# Patient Record
Sex: FEMALE | Race: WHITE | NOT HISPANIC OR LATINO | Employment: OTHER | URBAN - METROPOLITAN AREA
[De-identification: names, ages, dates, MRNs, and addresses within clinical notes are randomized per-mention and may not be internally consistent; named-entity substitution may affect disease eponyms.]

---

## 2023-03-17 DIAGNOSIS — B37.31 VAGINAL YEAST INFECTION: Primary | ICD-10-CM

## 2023-03-17 RX ORDER — FLUCONAZOLE 150 MG/1
150 TABLET ORAL ONCE
Qty: 1 TABLET | Refills: 0 | Status: SHIPPED | OUTPATIENT
Start: 2023-03-17 | End: 2023-03-22 | Stop reason: SDUPTHER

## 2023-03-21 PROBLEM — M51.26 HERNIATED NUCLEUS PULPOSUS, L4-5 RIGHT: Status: ACTIVE | Noted: 2023-03-21

## 2023-03-21 PROBLEM — M17.12 PRIMARY OSTEOARTHRITIS OF LEFT KNEE: Status: ACTIVE | Noted: 2023-03-21

## 2023-03-21 PROBLEM — N18.30 CHRONIC KIDNEY DISEASE (CKD), STAGE III (MODERATE) (MULTI): Status: ACTIVE | Noted: 2023-03-21

## 2023-03-21 PROBLEM — M96.1 FAILED BACK SYNDROME OF LUMBAR SPINE: Status: ACTIVE | Noted: 2023-03-21

## 2023-03-21 PROBLEM — I50.9 CONGESTIVE HEART FAILURE (MULTI): Status: ACTIVE | Noted: 2023-03-21

## 2023-03-21 PROBLEM — I48.91 ATRIAL FIBRILLATION (MULTI): Status: ACTIVE | Noted: 2023-03-21

## 2023-03-21 PROBLEM — E03.9 HYPOTHYROIDISM, ADULT: Status: ACTIVE | Noted: 2023-03-21

## 2023-03-21 PROBLEM — L03.90 CELLULITIS: Status: ACTIVE | Noted: 2023-03-21

## 2023-03-21 PROBLEM — G31.84 MILD COGNITIVE IMPAIRMENT: Status: ACTIVE | Noted: 2023-03-21

## 2023-03-21 PROBLEM — E78.5 HYPERLIPIDEMIA: Status: ACTIVE | Noted: 2023-03-21

## 2023-03-21 PROBLEM — R10.9 LEFT SIDED ABDOMINAL PAIN: Status: ACTIVE | Noted: 2023-03-21

## 2023-03-21 PROBLEM — C50.919 BREAST CANCER (MULTI): Status: ACTIVE | Noted: 2023-03-21

## 2023-03-21 PROBLEM — M51.27 HERNIATED NUCLEUS PULPOSUS, L5-S1, RIGHT: Status: ACTIVE | Noted: 2023-03-21

## 2023-03-21 PROBLEM — E78.5 HYPERLIPEMIA: Status: ACTIVE | Noted: 2023-03-21

## 2023-03-21 PROBLEM — M75.101 RIGHT ROTATOR CUFF TEAR: Status: ACTIVE | Noted: 2023-03-21

## 2023-03-21 PROBLEM — M75.51 BURSITIS OF RIGHT SHOULDER: Status: ACTIVE | Noted: 2023-03-21

## 2023-03-21 PROBLEM — M75.121 COMPLETE TEAR OF RIGHT ROTATOR CUFF: Status: ACTIVE | Noted: 2023-03-21

## 2023-03-21 PROBLEM — B37.31 VAGINAL YEAST INFECTION: Status: ACTIVE | Noted: 2023-03-21

## 2023-03-21 PROBLEM — C50.912: Status: ACTIVE | Noted: 2023-03-21

## 2023-03-21 PROBLEM — E55.9 VITAMIN D DEFICIENCY: Status: ACTIVE | Noted: 2023-03-21

## 2023-03-21 PROBLEM — N39.0 URINARY TRACT INFECTION: Status: ACTIVE | Noted: 2023-03-21

## 2023-03-21 PROBLEM — M47.816 DJD (DEGENERATIVE JOINT DISEASE), LUMBAR: Status: ACTIVE | Noted: 2023-03-21

## 2023-03-21 PROBLEM — B37.2 SKIN YEAST INFECTION: Status: ACTIVE | Noted: 2023-03-21

## 2023-03-21 PROBLEM — K21.9 GERD (GASTROESOPHAGEAL REFLUX DISEASE): Status: ACTIVE | Noted: 2023-03-21

## 2023-03-21 PROBLEM — M54.16 RIGHT LUMBAR RADICULOPATHY: Status: ACTIVE | Noted: 2023-03-21

## 2023-03-21 PROBLEM — N63.20 BREAST MASS, LEFT: Status: ACTIVE | Noted: 2023-03-21

## 2023-03-21 PROBLEM — G25.81 RESTLESS LEGS SYNDROME (RLS): Status: ACTIVE | Noted: 2023-03-21

## 2023-03-21 PROBLEM — I10 HYPERTENSION, ESSENTIAL: Status: ACTIVE | Noted: 2023-03-21

## 2023-03-21 PROBLEM — M25.511 RIGHT SHOULDER PAIN: Status: ACTIVE | Noted: 2023-03-21

## 2023-03-21 PROBLEM — N63.23 MASS OF LOWER OUTER QUADRANT OF LEFT BREAST: Status: ACTIVE | Noted: 2023-03-21

## 2023-03-21 PROBLEM — B37.89 CANDIDIASIS OF BREAST: Status: ACTIVE | Noted: 2023-03-21

## 2023-03-21 PROBLEM — R10.84 GENERALIZED ABDOMINAL PAIN: Status: ACTIVE | Noted: 2023-03-21

## 2023-03-21 PROBLEM — R06.09 DOE (DYSPNEA ON EXERTION): Status: ACTIVE | Noted: 2023-03-21

## 2023-03-21 PROBLEM — R92.8 ABNORMAL MAMMOGRAM: Status: ACTIVE | Noted: 2023-03-21

## 2023-03-21 RX ORDER — DILTIAZEM HYDROCHLORIDE 120 MG/1
1 CAPSULE, EXTENDED RELEASE ORAL DAILY
COMMUNITY
Start: 2021-12-17 | End: 2024-01-26 | Stop reason: WASHOUT

## 2023-03-21 RX ORDER — POTASSIUM CHLORIDE 750 MG/1
1 TABLET, FILM COATED, EXTENDED RELEASE ORAL
COMMUNITY
Start: 2020-05-13 | End: 2023-06-23

## 2023-03-21 RX ORDER — NALOXONE HYDROCHLORIDE 4 MG/.1ML
SPRAY NASAL
COMMUNITY
Start: 2022-03-23

## 2023-03-21 RX ORDER — LEVOTHYROXINE SODIUM 50 UG/1
1 TABLET ORAL DAILY
COMMUNITY
Start: 2020-05-13 | End: 2024-02-19 | Stop reason: SDUPTHER

## 2023-03-21 RX ORDER — NAPROXEN SODIUM 220 MG/1
81 TABLET, FILM COATED ORAL DAILY
COMMUNITY
Start: 2020-05-13 | End: 2023-12-20 | Stop reason: WASHOUT

## 2023-03-21 RX ORDER — NYSTATIN 100000 U/G
CREAM TOPICAL
COMMUNITY
End: 2024-01-26 | Stop reason: WASHOUT

## 2023-03-21 RX ORDER — NYSTATIN 100000 [USP'U]/G
POWDER TOPICAL
COMMUNITY
End: 2024-01-26 | Stop reason: SDUPTHER

## 2023-03-21 RX ORDER — HYDROCODONE BITARTRATE AND ACETAMINOPHEN 10; 325 MG/1; MG/1
1 TABLET ORAL 3 TIMES DAILY
COMMUNITY
Start: 2023-01-22 | End: 2023-10-19 | Stop reason: SDUPTHER

## 2023-03-21 RX ORDER — ROPINIROLE 4 MG/1
1 TABLET, FILM COATED ORAL 2 TIMES DAILY
COMMUNITY
Start: 2020-05-13 | End: 2024-02-27 | Stop reason: SDUPTHER

## 2023-03-21 RX ORDER — OMEPRAZOLE 40 MG/1
40 CAPSULE, DELAYED RELEASE ORAL DAILY
COMMUNITY
Start: 2020-05-13 | End: 2023-12-20 | Stop reason: WASHOUT

## 2023-03-21 RX ORDER — METOPROLOL SUCCINATE 50 MG/1
1 TABLET, EXTENDED RELEASE ORAL DAILY
COMMUNITY
Start: 2022-08-19 | End: 2024-01-11 | Stop reason: SDUPTHER

## 2023-03-21 RX ORDER — FUROSEMIDE 20 MG/1
1 TABLET ORAL DAILY
COMMUNITY
Start: 2020-05-27 | End: 2024-05-08 | Stop reason: SDUPTHER

## 2023-03-21 RX ORDER — CHOLECALCIFEROL (VITAMIN D3) 50 MCG
1 TABLET ORAL
COMMUNITY
Start: 2021-11-19 | End: 2023-12-20 | Stop reason: WASHOUT

## 2023-03-21 RX ORDER — LOSARTAN POTASSIUM 50 MG/1
1 TABLET ORAL 2 TIMES DAILY
COMMUNITY
Start: 2020-05-13 | End: 2023-12-20 | Stop reason: SDUPTHER

## 2023-03-21 RX ORDER — AMIODARONE HYDROCHLORIDE 200 MG/1
1 TABLET ORAL
COMMUNITY
Start: 2021-11-01 | End: 2024-01-11 | Stop reason: SDUPTHER

## 2023-03-22 ENCOUNTER — OFFICE VISIT (OUTPATIENT)
Dept: PRIMARY CARE | Facility: CLINIC | Age: 83
End: 2023-03-22
Payer: MEDICARE

## 2023-03-22 VITALS
HEART RATE: 67 BPM | OXYGEN SATURATION: 97 % | BODY MASS INDEX: 33 KG/M2 | WEIGHT: 180.4 LBS | DIASTOLIC BLOOD PRESSURE: 78 MMHG | SYSTOLIC BLOOD PRESSURE: 138 MMHG | TEMPERATURE: 96.9 F

## 2023-03-22 DIAGNOSIS — B37.31 YEAST VAGINITIS: Primary | ICD-10-CM

## 2023-03-22 DIAGNOSIS — B37.31 VAGINAL YEAST INFECTION: ICD-10-CM

## 2023-03-22 PROCEDURE — 99213 OFFICE O/P EST LOW 20 MIN: CPT | Performed by: NURSE PRACTITIONER

## 2023-03-22 PROCEDURE — 1159F MED LIST DOCD IN RCRD: CPT | Performed by: NURSE PRACTITIONER

## 2023-03-22 PROCEDURE — 3075F SYST BP GE 130 - 139MM HG: CPT | Performed by: NURSE PRACTITIONER

## 2023-03-22 PROCEDURE — 3078F DIAST BP <80 MM HG: CPT | Performed by: NURSE PRACTITIONER

## 2023-03-22 RX ORDER — FLUCONAZOLE 150 MG/1
150 TABLET ORAL ONCE
Qty: 1 TABLET | Refills: 0 | Status: SHIPPED | OUTPATIENT
Start: 2023-03-22 | End: 2023-03-22

## 2023-03-22 NOTE — PROGRESS NOTES
Subjective   Patient ID: Allyson Granados is a 83 y.o. female who presents for Vaginitis/Bacterial Vaginosis (Pt believed she had a yeast infection. Pt called Monday leaving message requesting pill and no one called back. She stated she is feeling better today. Sx: bleeding from sitting and moving around, odor, no discharge. /She's requesting Diflucan.//She refused exam.).    HPI   Reports being on antibiotics after breast cancer removal on 2/23/23 for 10 days and the symptoms started. Reports noticing an odor. Denies noticeable discharge or lesions. Denies urinary incontinence, urgency but the area does hurt occasionally when she pees. Reports this happens often when she takes antibiotics.    Reports using a prescription cream on it, unsure of the name at this time but has helped.    Review of Systems   All other systems reviewed and are negative.      Objective   /78   Pulse 67   Temp 36.1 °C (96.9 °F)   Wt 81.8 kg (180 lb 6.4 oz)   SpO2 97%   BMI 33.00 kg/m²     Physical Exam    Assessment/Plan   Yeast vaginitis  -     fluconazole (Diflucan) 150 mg tablet; Take 1 tablet (150 mg) by mouth 1 time for 1 dose.    RTC PRN

## 2023-03-22 NOTE — PROGRESS NOTES
Subjective   Patient ID: Allyson Granados is a 83 y.o. female who presents for Vaginitis/Bacterial Vaginosis (Pt believed she had a yeast infection. Pt called Monday leaving message requesting pill and no one called back. She stated she is feeling better today. Sx: bleeding from sitting and moving around, odor, no discharge. /She's requesting Diflucan.//She refused exam.).    HPI     Patient requesting a prescription for diflucan. She was recently prescribed antibiotics and shortly after, developed vaginal discomfort and an odor. States when this happens, she needs diflucan. She denies any discharge or bleeding. She denies UTI symptoms.     Review of Systems  ROS negative except as noted above in HPI.       Objective   /78   Pulse 67   Temp 36.1 °C (96.9 °F)   Wt 81.8 kg (180 lb 6.4 oz)   SpO2 97%   BMI 33.00 kg/m²     Physical Exam  General: Alert and oriented, in no acute distress. Appears stated age, well-nourished, and well hydrated  HEENT:  - Head: Normocephalic and atraumatic   - Eyes: EOMI, PERRLA  - ENT: Hearing grossly intact  Heart: RRR. No murmurs, clicks, or rubs  Lungs: Unlabored breathing. CTAB with no crackles, wheezes, or rhonchi  Abdomen: Normal BS in all 4 quadrants. Soft, non-tender, non-distended, with no masses  Extremities: Warm and well perfused. No edema. Normal peripheral pulses  Musculoskeletal: Normal gait and station  Neurological: Alert and oriented. No gross neurological deficits  Psychological: Appropriate mood and affect  Skin: No rash, abnormal lesions, cyanosis, or erythema      Assessment/Plan   Yeast vaginitis  - Declined pelvic exam  - RX fluconazole 150 mg once  - RTC if symptoms persist or worsen, will need a pelvic exam    Reviewed and approved by ROSA WOODY on 3/22/23 at 4:14 PM.

## 2023-05-01 LAB
ALBUMIN (G/DL) IN SER/PLAS: NORMAL
ALBUMIN (MG/L) IN URINE: NORMAL
ALBUMIN/CREATININE (UG/MG) IN URINE: NORMAL
ANION GAP IN SER/PLAS: NORMAL
APPEARANCE, URINE: NORMAL
ASCORBIC ACID: NORMAL MG/DL
BILIRUBIN, URINE: NORMAL
BLOOD, URINE: NORMAL
CALCIDIOL (25 OH VITAMIN D3) (NG/ML) IN SER/PLAS: NORMAL
CALCIUM (MG/DL) IN SER/PLAS: NORMAL
CARBON DIOXIDE, TOTAL (MMOL/L) IN SER/PLAS: NORMAL
CHLORIDE (MMOL/L) IN SER/PLAS: NORMAL
COLOR, URINE: NORMAL
CREATININE (MG/DL) IN SER/PLAS: NORMAL
CREATININE (MG/DL) IN URINE: NORMAL
CREATININE (MG/DL) IN URINE: NORMAL
ERYTHROCYTE DISTRIBUTION WIDTH (RATIO) BY AUTOMATED COUNT: NORMAL
ERYTHROCYTE MEAN CORPUSCULAR HEMOGLOBIN CONCENTRATION (G/DL) BY AUTOMATED: NORMAL
ERYTHROCYTE MEAN CORPUSCULAR VOLUME (FL) BY AUTOMATED COUNT: NORMAL
ERYTHROCYTES (10*6/UL) IN BLOOD BY AUTOMATED COUNT: NORMAL
GFR FEMALE: NORMAL
GFR MALE: NORMAL
GLUCOSE (MG/DL) IN SER/PLAS: NORMAL
GLUCOSE, URINE: NORMAL
HEMATOCRIT (%) IN BLOOD BY AUTOMATED COUNT: NORMAL
HEMOGLOBIN (G/DL) IN BLOOD: NORMAL
KETONES, URINE: NORMAL
LEUKOCYTE ESTERASE, URINE: NORMAL
LEUKOCYTES (10*3/UL) IN BLOOD BY AUTOMATED COUNT: NORMAL
NITRITE, URINE: NORMAL
NRBC (PER 100 WBCS) BY AUTOMATED COUNT: NORMAL
PARATHYRIN INTACT (PG/ML) IN SER/PLAS: NORMAL
PH, URINE: NORMAL
PHOSPHATE (MG/DL) IN SER/PLAS: NORMAL
PLATELETS (10*3/UL) IN BLOOD AUTOMATED COUNT: NORMAL
POTASSIUM (MMOL/L) IN SER/PLAS: NORMAL
PROTEIN (MG/DL) IN URINE: NORMAL
PROTEIN, URINE: NORMAL
PROTEIN/CREATININE (MG/MG) IN URINE: NORMAL
SODIUM (MMOL/L) IN SER/PLAS: NORMAL
SPECIFIC GRAVITY, URINE: NORMAL
UREA NITROGEN (MG/DL) IN SER/PLAS: NORMAL
UROBILINOGEN, URINE: NORMAL

## 2023-05-02 LAB
6-ACETYLMORPHINE: <25 NG/ML
7-AMINOCLONAZEPAM: <25 NG/ML
ALPHA-HYDROXYALPRAZOLAM: <25 NG/ML
ALPHA-HYDROXYMIDAZOLAM: <25 NG/ML
ALPRAZOLAM: <25 NG/ML
AMPHETAMINE (PRESENCE) IN URINE BY SCREEN METHOD: ABNORMAL
BARBITURATES PRESENCE IN URINE BY SCREEN METHOD: ABNORMAL
CANNABINOIDS IN URINE BY SCREEN METHOD: ABNORMAL
CHLORDIAZEPOXIDE: <25 NG/ML
CLONAZEPAM: <25 NG/ML
COCAINE (PRESENCE) IN URINE BY SCREEN METHOD: ABNORMAL
CODEINE: <50 NG/ML
CREATINE, URINE FOR DRUG: 25.7 MG/DL
DIAZEPAM: <25 NG/ML
DRUG SCREEN COMMENT URINE: ABNORMAL
EDDP: <25 NG/ML
FENTANYL CONFIRMATION, URINE: <2.5 NG/ML
HYDROCODONE: 555 NG/ML
HYDROMORPHONE: 79 NG/ML
LORAZEPAM: <25 NG/ML
METHADONE CONFIRMATION,URINE: <25 NG/ML
MIDAZOLAM: <25 NG/ML
MORPHINE URINE: <50 NG/ML
NORDIAZEPAM: <25 NG/ML
NORFENTANYL: <2.5 NG/ML
NORHYDROCODONE: 245 NG/ML
NOROXYCODONE: <25 NG/ML
O-DESMETHYLTRAMADOL: <50 NG/ML
OXAZEPAM: <25 NG/ML
OXYCODONE: <25 NG/ML
OXYMORPHONE: <25 NG/ML
PHENCYCLIDINE (PRESENCE) IN URINE BY SCREEN METHOD: ABNORMAL
TEMAZEPAM: <25 NG/ML
TRAMADOL: <50 NG/ML
ZOLPIDEM METABOLITE (ZCA): <25 NG/ML
ZOLPIDEM: <25 NG/ML

## 2023-06-23 DIAGNOSIS — I10 ESSENTIAL (PRIMARY) HYPERTENSION: ICD-10-CM

## 2023-06-23 RX ORDER — POTASSIUM CHLORIDE 750 MG/1
TABLET, EXTENDED RELEASE ORAL
Qty: 90 TABLET | Refills: 1 | Status: SHIPPED | OUTPATIENT
Start: 2023-06-23 | End: 2023-12-22

## 2023-07-19 LAB
ANION GAP IN SER/PLAS: 15 MMOL/L (ref 10–20)
BASOPHILS (10*3/UL) IN BLOOD BY AUTOMATED COUNT: 0.05 X10E9/L (ref 0–0.1)
BASOPHILS/100 LEUKOCYTES IN BLOOD BY AUTOMATED COUNT: 0.6 % (ref 0–2)
CALCIUM (MG/DL) IN SER/PLAS: 8.2 MG/DL (ref 8.6–10.3)
CARBON DIOXIDE, TOTAL (MMOL/L) IN SER/PLAS: 26 MMOL/L (ref 21–32)
CHLORIDE (MMOL/L) IN SER/PLAS: 102 MMOL/L (ref 98–107)
CREATININE (MG/DL) IN SER/PLAS: 1.2 MG/DL (ref 0.5–1.05)
EOSINOPHILS (10*3/UL) IN BLOOD BY AUTOMATED COUNT: 0.16 X10E9/L (ref 0–0.4)
EOSINOPHILS/100 LEUKOCYTES IN BLOOD BY AUTOMATED COUNT: 2 % (ref 0–6)
ERYTHROCYTE DISTRIBUTION WIDTH (RATIO) BY AUTOMATED COUNT: 14.2 % (ref 11.5–14.5)
ERYTHROCYTE MEAN CORPUSCULAR HEMOGLOBIN CONCENTRATION (G/DL) BY AUTOMATED: 31 G/DL (ref 32–36)
ERYTHROCYTE MEAN CORPUSCULAR VOLUME (FL) BY AUTOMATED COUNT: 91 FL (ref 80–100)
ERYTHROCYTES (10*6/UL) IN BLOOD BY AUTOMATED COUNT: 5.08 X10E12/L (ref 4–5.2)
GFR FEMALE: 45 ML/MIN/1.73M2
GLUCOSE (MG/DL) IN SER/PLAS: 95 MG/DL (ref 74–99)
HEMATOCRIT (%) IN BLOOD BY AUTOMATED COUNT: 46.1 % (ref 36–46)
HEMOGLOBIN (G/DL) IN BLOOD: 14.3 G/DL (ref 12–16)
IMMATURE GRANULOCYTES/100 LEUKOCYTES IN BLOOD BY AUTOMATED COUNT: 0.2 % (ref 0–0.9)
LEUKOCYTES (10*3/UL) IN BLOOD BY AUTOMATED COUNT: 8.2 X10E9/L (ref 4.4–11.3)
LYMPHOCYTES (10*3/UL) IN BLOOD BY AUTOMATED COUNT: 2.22 X10E9/L (ref 0.8–3)
LYMPHOCYTES/100 LEUKOCYTES IN BLOOD BY AUTOMATED COUNT: 27.1 % (ref 13–44)
MONOCYTES (10*3/UL) IN BLOOD BY AUTOMATED COUNT: 0.6 X10E9/L (ref 0.05–0.8)
MONOCYTES/100 LEUKOCYTES IN BLOOD BY AUTOMATED COUNT: 7.3 % (ref 2–10)
NEUTROPHILS (10*3/UL) IN BLOOD BY AUTOMATED COUNT: 5.13 X10E9/L (ref 1.6–5.5)
NEUTROPHILS/100 LEUKOCYTES IN BLOOD BY AUTOMATED COUNT: 62.8 % (ref 40–80)
PLATELETS (10*3/UL) IN BLOOD AUTOMATED COUNT: 311 X10E9/L (ref 150–450)
POTASSIUM (MMOL/L) IN SER/PLAS: 4.2 MMOL/L (ref 3.5–5.3)
SODIUM (MMOL/L) IN SER/PLAS: 139 MMOL/L (ref 136–145)
UREA NITROGEN (MG/DL) IN SER/PLAS: 20 MG/DL (ref 6–23)

## 2023-07-24 ENCOUNTER — HOSPITAL ENCOUNTER (OUTPATIENT)
Dept: DATA CONVERSION | Facility: HOSPITAL | Age: 83
End: 2023-07-25
Attending: SURGERY | Admitting: SURGERY
Payer: MEDICARE

## 2023-07-24 DIAGNOSIS — C50.919 MALIGNANT NEOPLASM OF UNSPECIFIED SITE OF UNSPECIFIED FEMALE BREAST (MULTI): ICD-10-CM

## 2023-07-24 DIAGNOSIS — C50.912 MALIGNANT NEOPLASM OF UNSPECIFIED SITE OF LEFT FEMALE BREAST (MULTI): ICD-10-CM

## 2023-07-24 DIAGNOSIS — C77.3 SECONDARY AND UNSPECIFIED MALIGNANT NEOPLASM OF AXILLA AND UPPER LIMB LYMPH NODES (MULTI): ICD-10-CM

## 2023-07-24 DIAGNOSIS — R92.8 OTHER ABNORMAL AND INCONCLUSIVE FINDINGS ON DIAGNOSTIC IMAGING OF BREAST: ICD-10-CM

## 2023-08-01 LAB
COMPLETE PATHOLOGY REPORT: NORMAL
CONVERTED ADDENDUM DIAGNOSIS 2: NORMAL
CONVERTED CLINICAL DIAGNOSIS-HISTORY: NORMAL
CONVERTED FINAL DIAGNOSIS: NORMAL
CONVERTED FINAL REPORT PDF LINK TO COPY AND PASTE: NORMAL
CONVERTED GROSS DESCRIPTION: NORMAL

## 2023-09-12 ENCOUNTER — APPOINTMENT (OUTPATIENT)
Dept: PRIMARY CARE | Facility: CLINIC | Age: 83
End: 2023-09-12
Payer: MEDICARE

## 2023-09-14 ENCOUNTER — APPOINTMENT (OUTPATIENT)
Dept: PRIMARY CARE | Facility: CLINIC | Age: 83
End: 2023-09-14
Payer: MEDICARE

## 2023-09-29 ENCOUNTER — HOSPITAL ENCOUNTER (OUTPATIENT)
Dept: RADIATION ONCOLOGY | Facility: CLINIC | Age: 83
Setting detail: RADIATION/ONCOLOGY SERIES
Discharge: STILL ADMITTED OR ADMINISTRATIVE | End: 2023-09-29
Payer: MEDICARE

## 2023-09-29 VITALS — WEIGHT: 185.63 LBS | BODY MASS INDEX: 36.44 KG/M2 | HEIGHT: 60 IN

## 2023-09-30 NOTE — H&P
History & Physical Reviewed:   I have reviewed the History and Physical dated:  06-Jul-2023   History and Physical reviewed and relevant findings noted. Patient examined to review pertinent physical  findings.: No significant changes   Home Medications Reviewed: no changes noted   Allergies Reviewed: no changes noted       ERAS (Enhanced Recovery After Surgery):  ·  ERAS Patient: no     Consent:   COVID-19 Consent:  ·  COVID-19 Risk Consent Surgeon has reviewed key risks related to the risk of roger COVID-19 and if they contract COVID-19 what the risks are.       Electronic Signatures:  Brianda Hdez)  (Signed 24-Jul-2023 09:17)   Authored: History & Physical Reviewed, ERAS, Consent,  Note Completion      Last Updated: 24-Jul-2023 09:17 by Brianda Hdez)

## 2023-09-30 NOTE — PROGRESS NOTES
Service: Surgery     Subjective Data:   LISA WYNNE is a 83 year old Female who is Hospital Day # 2 and POD #1 for ;1.  Left completion axillary dissection ;2.  Left axillary lymph node excision with Magseed localization;3. Excision of left  axillary skin nodule ;     Patient is awake in bed this morning. She is expressing a lot of anxiety about leaving today. She reports her daughter will be able to be there tomorrow to help take care of her and she would feel better  about leaving then. She denies any fever, chills, chest pain, shortness of breath, or other pain.    Overnight Events: Patient had an uneventful night.     Objective Data:     Objective Information:      T   P  R  BP   MAP  SpO2   Value  36.5  61  17  168/81      93%  Date/Time 7/25 8:31 7/25 8:31 7/25 8:31 7/25 8:31 7/25 8:31  Range  (36.3C - 36.7C )  (59 - 66 )  (16 - 17 )  (117 - 181 )/ (54 - 84 )    (93% - 98% )   As of 24-Jul-2023 15:48:00, patient is on 3% oxygen via nasal cannula.      Pain reported at 7/25 10:37: 6 = Moderate    ---- Intake and Output  -----  Mn/Dy/Year Time  Intake   Output  Net  Jul 25, 2023 6:00 am  0   55  -55  Jul 24, 2023 10:00 pm  555   50  505  Jul 24, 2023 2:00 pm  600   15  585    The Intake and Output Totals for the last 24 hours are:      Intake   Output  Net      1155   120  1035    Physical Exam by System     Constitutional: Well developed, awake/alert/oriented  x3, no distress, alert and cooperative   Respiratory/Thorax: Patent airways, Clear bilaterally  in all fields, non labored breathing   Cardiovascular: RRR   Gastrointestinal: Nondistended, soft, non-tender   Genitourinary: Voiding without concern   Musculoskeletal: ROM intact, no joint swelling, normal  strength   Extremities: normal extremities, no cyanosis edema,  contusions or wounds, no clubbing   Breast: Incision site C/D/I, edges well approximated,  covered in Dermabond. Dressing over the incision is clean. Drain site clean serosanguinous  drainage 105cc out since surgery.   Psychological: Appropriate mood and behavior   Skin: Warm and dry, no lesions, no rashes     Medication     Medications:          Continuous Medications       --------------------------------  No continuous medications are active       Scheduled Medications       --------------------------------    1. Amiodarone:  200  mg  Oral  Every 24 Hours    2. dilTIAZem (CARDIZEM CD) Extended Release (24 hour):  120  mg  Oral  Every 24 Hours    3. Docusate:  100  mg  Oral  2 Times a Day    4. Furosemide:  40  mg  Oral  2 Times a Day    5. Levothyroxine:  50  microgram(s)  Oral  Daily    6. Losartan  - PEDS:  50  mg  Oral  2 Times a Day    7. Metoprolol Succinate Extended Release:  50  mg  Oral  Daily    8. Potassium Chloride Extended Release:  10  mEq  Oral  Daily    9. rOPINIRole (REQUIP):  4  mg  Oral  2 Times a Day         PRN Medications       --------------------------------    1. Acetaminophen:  650  mg  Oral  Every 4 Hours    2. HYDROcodone 5 mg - Acetaminophen 325 m  tablet(s)  Oral  Every 4 Hours    3. Ondansetron Injectable:  4  mg  IntraVenous Push  Every 4 Hours        Radiology Results     Results:        Impression:    Status post surgical excision of Left axillary Magseed localization.        Mamm Specimen Imaging [2023 11:53AM]      Impression:    Status post ultrasound-guided Magseed localization of  left axillary  node and tissue marker.           Mamm Ultrasound Guided Placement Breast Loc Dev or Magseed [2023  9:26AM]      Assessment and Plan:   Comorbidities   ·  Comorbidity obesity   ·  Obesity obesity (BMI 35-39.9)   ·  BMI 36.25     Code Status   ·  Code Status Full Code     Assessment:    LISA WYNNE is a 83 year old Female who is Hospital Day # 2 and POD #1 for ;1.  Left completion axillary  dissection ;2.  Left axillary lymph node excision with Magseed localization;3. Excision of left axillary skin nodule ; Intra-op Findings: multiple firm  palpable nodes.    Reproductive: Incision site C/D/I, edges well approximated, covered in Dermabond. Dressing over the incision is clean. Drain site clean serosanguinous drainage 105cc out since surgery.     Hx: Malignant neoplasm of the left breast  - Drain care instruction reviewed  - PT evaluated     Neuro: Acute post op pain, controlled  - Follows with outpatient pain management  - Continue pain control through there office  - OOB/ ambulate 5x per day     CV: RRR. HR (59-66)  BP (117-181/54-84)     Hx: a-fib, bradycardia, HTN, chest pain, acute heart failure  - Continue all home medications  - VS every 4 hours     Pulm: Non labored breath on RA     Hx: COPD  - IS every hour while awake   - Pulse ox every 4 hours with VS     GI: Soft non distended  - Diet as tolerated  - PRN antiemetic   - Bowel regimen    : Voiding independently     Hx: CKD  - Monitor I&Os     HEME:   - DVT Prophylaxis: SCDs/ ambulate  - no active s/s of bleeding    Endo:      Hx: Hypothyroidism  - continue home medications    ID: afebrile  - Monitor for s/s infection    Dispo: Patient feel safer being discharged tomorrow since she will have support at home then. Discussed plan with Dr. Lemuel Olson DC in the early AM on 7/26    Total time spent 45 minutes, and greater than 50% of  time was spent in counseling/coordination of care       Electronic Signatures for Addendum Section:   Hi Brito (PAC) (Signed Addendum 25-Jul-2023 14:26)   Patient seen again this afternoon she is feeling more confident about going home now. She was going to call her son to come pick here up and take her home later  this evening.     Electronic Signatures:  Hi Brito (PAC)  (Signed 25-Jul-2023 13:52)   Authored: Service, Subjective Data, Objective Data, Assessment  and Plan, Note Completion      Last Updated: 25-Jul-2023 14:26 by Hi Brito (PAC)

## 2023-09-30 NOTE — DISCHARGE SUMMARY
Send Summary:   Discharge Summary Providers:  Provider Role Provider Name   · Attending Brianda Hdez   · Primary Gini Orantes   · Primary Miri Casiano   · Primary Angelia Do       Note Recipients: Gini Orantes APRN-Brianda Bonner, Miri Wiley MD (Resident)       Discharge:    Summary:   Admission Date: .24-Jul-2023 06:53:00   Discharge Date: 25-Jul-2023   Attending Physician at Discharge: Brianda Hdez   Admission Reason: Malignant neoplasm of left breast   Final Discharge Diagnoses: Malignant neoplasm of  left breast   Procedures: Date: 24-Jul-2023 22:03:00  Procedure Name:   1.  Left completion axillary dissection   2.  Left axillary lymph node excision with Magseed localization  3. Excision of left axillary skin nodule   Condition at Discharge: Satisfactory   Disposition at Discharge: .Home   Physical Exam:    PE:  Constitutional: A&Ox3, calm and cooperative  Eyes: clear sclera   ENMT: Moist mucous membranes  Head/Neck: Neck supple, no JVD  Cardiovascular: NSR, RRR. 2+ equal pulses of the distal extremities.  Respiratory/Thorax: CTAB, No rales, rhonchi, or wheezes. Good symmetric chest expansion. on RA  Gastrointestinal: Abdomen nondistended, soft, nontender  Genitourinary: Voiding independently   Musculoskeletal: ROM intact, no joint swelling, normal strength  Extremities: NAYLOR, No peripheral edema  Neurological: No focal deficits   Psychological: Appropriate mood and behavior  Reproductive: Incision site C/D/I, edges well approximated, covered in Dermabond. Dressing over the incision is clean. Drain site clean serosanguinous drainage 105cc out since surgery.  Skin: Warm and dry.   Hospital Course:    Briefly, the patient is a 83 year old female with past history of breast cancer and is presented on 7/24 surgical treatment of her L breast cancer.    HOSPITAL COURSE: She underwent 1. Left completion axillary dissection, 2. Left axially lymph node excision with Magseed localization, 3.  Excision of left axillary skin which she tolerated well.  She was admitted overnight to the surgical floor for recovery,  pain management, and monitoring.  On postoperative day #1, patient was tolerating a regular diet, ambulating, and reporting good pain control with oral analgesia.  She received drain care teaching and instructions on postoperative care with supportive  bra and s/s of infection.  She will follow up next week with Dr. Bryan regarding drain outputs and wound check. The incisions to bilateral breasts were clean, dry, and intact with well approximate incision with dermabond and tegaderm. Follow with outpatient  pain management and will continue pain control through them.    Immunizations:    Immunizations:  20-Feb-2021   SARS-CoV-2 (COVID-19): Immunizations, 20-Feb-2021 23-Jan-2021   SARS-CoV-2 (COVID-19): Immunizations, 23-Jan-2021 12-Jul-2012   Td (adult)- Tetanus-Diptheria: Immunizations, 12-Jul-2012      Discharge Information:    and Continuing Care:   Lab Results - Pending:    Surgical Pathology Drawn at 24-Jul-2023 11:10:00  Radiology Results - Pending: None   Discharge Instructions:    You have a bulb drain in place that you will be sent home with. To empty the drain, open cap and the top and tip into cup to empty. Squeeze drain then replace the  cap.   Please empty the drain and record its output  daily and bring these numbers to your follow up appointment.  This drain is sutured into place. Please keep it dry and change the dressing sponge around the drain daily or it get soiled.    Please measure and record output (amount, clarity, and color) 3 times a day.   Please take output record with you to follow up appointment.      Discharge Medications: Home Medication   levothyroxine 50 mcg (0.05 mg) oral tablet - 1 tab(s) orally once a day  potassium chloride 10 mEq oral capsule, extended release - 1 cap(s) orally once a day  rOPINIRole 4 mg oral tablet, extended release - orally 2 times a  day  apixaban 5 mg oral tablet - 1 tab(s) orally every 12 hours  amiodarone 200 mg oral tablet - 1 tab(s) orally every 24 hours  dilTIAZem 120 mg oral tablet - orally once a day  furosemide 20 mg oral tablet - 2 tab(s) orally once a day  losartan 50 mg oral tablet - 1 tab(s) orally 2 times a day  Metoprolol Tartrate 50 mg oral tablet - 1 tab(s) orally 2 times a day  docusate sodium 100 mg oral capsule - 1 cap(s) orally 2 times a day     PRN Medication   HYDROCODNE  ACETAMETAPHINE - 1   every 8 hours, As Needed       Electronic Signatures:  Hi Brito (PAC)  (Signed 25-Jul-2023 15:40)   Authored: Send Summary, Summary Content, Immunizations,  Ongoing Care, Note Completion      Last Updated: 25-Jul-2023 15:40 by Hi Brito (PAC)

## 2023-10-02 ENCOUNTER — HOSPITAL ENCOUNTER (OUTPATIENT)
Dept: RADIATION ONCOLOGY | Facility: CLINIC | Age: 83
Setting detail: RADIATION/ONCOLOGY SERIES
Discharge: STILL ADMITTED OR ADMINISTRATIVE | End: 2023-10-02
Payer: MEDICARE

## 2023-10-02 DIAGNOSIS — C77.3 SECONDARY AND UNSPECIFIED MALIGNANT NEOPLASM OF AXILLA AND UPPER LIMB LYMPH NODES (MULTI): ICD-10-CM

## 2023-10-02 DIAGNOSIS — Z51.0 ENCOUNTER FOR ANTINEOPLASTIC RADIATION THERAPY: ICD-10-CM

## 2023-10-02 DIAGNOSIS — C50.811 MALIGNANT NEOPLASM OF OVERLAPPING SITES OF RIGHT FEMALE BREAST (MULTI): ICD-10-CM

## 2023-10-02 LAB
RAD ONC MSQ ACTUAL FRACTIONS DELIVERED: 15
RAD ONC MSQ ACTUAL SESSION DELIVERED DOSE: 245 CGRAY
RAD ONC MSQ ACTUAL TOTAL DOSE: 3675 CGRAY
RAD ONC MSQ ELAPSED DAYS: 20
RAD ONC MSQ LAST DATE: NORMAL
RAD ONC MSQ PRESCRIBED FRACTIONAL DOSE: 245 CGRAY
RAD ONC MSQ PRESCRIBED NUMBER OF FRACTIONS: 25
RAD ONC MSQ PRESCRIBED TECHNIQUE: NORMAL
RAD ONC MSQ PRESCRIBED TOTAL DOSE: 6125 CGRAY
RAD ONC MSQ PRESCRIPTION PATTERN COMMENT: NORMAL
RAD ONC MSQ START DATE: NORMAL
RAD ONC MSQ TREATMENT COURSE NUMBER: 1
RAD ONC MSQ TREATMENT SITE: NORMAL

## 2023-10-02 PROCEDURE — 77385 HC INTENSITY-MODULATED RADIATION THERAPY (IMRT), SIMPLE: CPT | Mod: PO | Performed by: RADIOLOGY

## 2023-10-02 PROCEDURE — 77336 RADIATION PHYSICS CONSULT: CPT | Mod: PO | Performed by: RADIOLOGY

## 2023-10-02 PROCEDURE — 77014 CHG CT GUIDANCE RADIATION THERAPY FLDS PLACEMENT: CPT | Performed by: RADIOLOGY

## 2023-10-02 NOTE — OP NOTE
PROCEDURE DETAILS    Preoperative Diagnosis:  Left breast invasive ductal carcinoma, Stage T2N1M0    Postoperative Diagnosis:  Left breast invasive ductal carcinoma, Stage T2N1M0    Surgeon: Brianda Hdez  Resident/Fellow/Other Assistant: Narciso Vaughn    Procedure:    1.  Left completion axillary dissection   2.  Left axillary lymph node excision with Magseed localization  3. Excision of left axillary skin nodule     Anesthesia: Leonardo Hatch  Estimated Blood Loss: 20 cc  Findings: multiple firm palpable nodes   Specimens(s) Collected: yes,  left axillary contents levels 1, 2, and 3   Patient Returned To/Condition: PACU/stable         Operative Report:   INDICATIONS FOR PROCEDURE:    Allyson Petersen is an 83-year-old female s/p left breast partial mastectomy and sentinel lymph node biopsy 2/22/23 yielding invasive ductal carcinoma, ER-/[place]-/HER2-, [id number]. Due to her  medical co-morbidities, she was not a candidate for chemotherapy and proceeded to radiation planning, where a CT scan in May 2023 showed several new enlarged leaft axillary lymph nodes. Core needle biopsy was positive for metastatic disease. I recommended  completion left axillary dissection and excision of the positive left axillary lymph node with Magseed localization. Following a detailed discussion regarding risks, benefits, and alternatives, informed consent was obtained, and we agreed to proceed.  Prior to her procedure today she had a magnetic seed (Magseed) placed in the previously biopsied left axillary lymph node for intraoperative localization purposes.      DESCRIPTION OF PROCEDURE:    The patient was brought to the operating room and positioned supine on the OR table.  Following patient identification and a time-out procedure, SCDs were applied, and antibiotics  were administered.  General anesthesia was initiated.  I utilized the Sentimag probe to  confirm the placement of the Magseed within the axilla. The operative field was  prepped and draped in the standard sterile fashion.      We began in the axilla.  Her prior incision was lengthened. 1% lidocaine was injected subcutaneously. An incision was made sharply.  Dissection was carried down through the skin and subcutaneous tissues to the clavipectoral fascia, which was widely incised.  There was dense scar tissue from her prior axillary procedure, which was taken down.                 The pectoralis minor was freed from the pectoralis major, and dissection was carried out medially to into the axillary space. Laterally, we dissected the tissue from latissimis dorsi  toward the axilla. The axillary vein was identified at its apex and dissection was carried out laterally along the inferior portion of the vein.  There was a palpably positive lymph node very high at the apex of the vein with was carefully removed using  clips and sharp dissection. There were also multiple palpable lymph nodes throughout the axilla, adherent to the thoracodorsal bundle in particular. The long thoracic nerve was identified and preserved against the chest wall.  The thoracodorsal bundle  was identified and preserved including the thoracodorsal nerve. The axilla was then cleared of its contents by isolating the remaining lymphatic tissue using blunt and sharp dissection. Small clips were used on branching vessels and lymphatics with careful  attention to hemostasis.  Level 3 and Tierra?s space were palpated and there was an additional suspicious  node in level 3 which was dissected free and removed. The entire specimen was removed and labeled as right axillary contents levels 1, 2, and 3.  The Sentimag probe was used to confirm removal of the biopsied and localized positive lymph node. A specimen  radiograph was also taken using the intraoperative Trident machine. This showed multiple lymph nodes, 2 biopsy clips, and one Magseed. I personally interpreted these images intraoperatively. The specimen was then sent  to the pathology lab for permanent  section.      When we inspected the axilla and chest wall to identify a drain site, I noticed a subcutaneous nodule inferior and lateral to the incision which was hard to palpation. It was unclear whether this was an old core needle biopsy site or potentially a skin  metastasis due to the aggressive nature of her braest cancer. Therefore, I marked an ellipse of skin around the nodule and excised it full thickness down to the subcutaneous tissue. The specimen was labeled left axillary skin nodule and sent to pathology  for permanent section.     The axillary site was copiously irrigated with warm sterile saline solution. Hemostatsis was achieved. One 15-Jamaican SHANELL drain was placed through the skin nodule excvision site and secured with 3-0 Nylon drain stitch. The deeper tissue at this site and  the clavipectoral fascia were closed with 3-0 Vicryl suture in an interrupted fashion. 0.5% Marcaine was injected subcutaneously for analgesia. The axillary incision was then closed in layers with an interrupted 3-0 Vicryl in the deep dermis followed  by a running subcuticular 4-0 Monocryl for the skin. The nosule exision site was closed with interrupted 3-0 Nylon.  Skin glue, sterile dressings, fluffs, and a surgical bra were then applied.     The patient tolerated the procedure well.  There were no immediate complications.  All counts were correct.  Estimated blood loss was 20 cc.  I was present for and performed the entire procedure.  The patient was then awakened and transported to the PACU  in stable condition.        Brianda Hdez MD  Breast Surgical Oncology   pager [phone]    Note Recipients:   Gini Orantes, APRN-Miri Alfonso MD (Resident)      Sammie Synoptic Op Report:  Breast Axillary Dissection  Operation performed with curative intent: yes  Resection was performed within the boundaries of the axillary vein, chest wall (serratus anterior), and latissimus dorsi: yes  Nerves  identified and preserved during dissection: long thoracic nerve, thoracodorsal nerve and branches of the intercostobrachial nerves  Level III nodes were removed: yes       Explanation: palpable firm node in level III                    Attestation:   Note Completion:  Attending Attestation I performed the procedure without a resident         Electronic Signatures:  Brianda Hdez)  (Signed 24-Jul-2023 22:10)   Authored: Post-Operative Note, Chart Review, Note Completion      Last Updated: 24-Jul-2023 22:10 by Brianda Hdez)

## 2023-10-03 ENCOUNTER — HOSPITAL ENCOUNTER (OUTPATIENT)
Dept: RADIATION ONCOLOGY | Facility: CLINIC | Age: 83
Setting detail: RADIATION/ONCOLOGY SERIES
Discharge: STILL ADMITTED OR ADMINISTRATIVE | End: 2023-10-03
Payer: MEDICARE

## 2023-10-03 DIAGNOSIS — C77.3 SECONDARY AND UNSPECIFIED MALIGNANT NEOPLASM OF AXILLA AND UPPER LIMB LYMPH NODES (MULTI): ICD-10-CM

## 2023-10-03 DIAGNOSIS — Z51.0 ENCOUNTER FOR ANTINEOPLASTIC RADIATION THERAPY: ICD-10-CM

## 2023-10-03 DIAGNOSIS — C50.811 MALIGNANT NEOPLASM OF OVERLAPPING SITES OF RIGHT FEMALE BREAST (MULTI): ICD-10-CM

## 2023-10-03 LAB
RAD ONC MSQ ACTUAL FRACTIONS DELIVERED: 16
RAD ONC MSQ ACTUAL SESSION DELIVERED DOSE: 245 CGRAY
RAD ONC MSQ ACTUAL TOTAL DOSE: 3920 CGRAY
RAD ONC MSQ ELAPSED DAYS: 21
RAD ONC MSQ LAST DATE: NORMAL
RAD ONC MSQ PRESCRIBED FRACTIONAL DOSE: 245 CGRAY
RAD ONC MSQ PRESCRIBED NUMBER OF FRACTIONS: 25
RAD ONC MSQ PRESCRIBED TECHNIQUE: NORMAL
RAD ONC MSQ PRESCRIBED TOTAL DOSE: 6125 CGRAY
RAD ONC MSQ PRESCRIPTION PATTERN COMMENT: NORMAL
RAD ONC MSQ START DATE: NORMAL
RAD ONC MSQ TREATMENT COURSE NUMBER: 1
RAD ONC MSQ TREATMENT SITE: NORMAL

## 2023-10-03 PROCEDURE — 77385 HC INTENSITY-MODULATED RADIATION THERAPY (IMRT), SIMPLE: CPT | Mod: PO | Performed by: RADIOLOGY

## 2023-10-03 PROCEDURE — 77014 CHG CT GUIDANCE RADIATION THERAPY FLDS PLACEMENT: CPT | Performed by: RADIOLOGY

## 2023-10-04 LAB
RAD ONC MSQ ACTUAL FRACTIONS DELIVERED: 17
RAD ONC MSQ ACTUAL SESSION DELIVERED DOSE: 245 CGRAY
RAD ONC MSQ ACTUAL TOTAL DOSE: 4165 CGRAY
RAD ONC MSQ ELAPSED DAYS: 22
RAD ONC MSQ LAST DATE: NORMAL
RAD ONC MSQ PRESCRIBED FRACTIONAL DOSE: 245 CGRAY
RAD ONC MSQ PRESCRIBED NUMBER OF FRACTIONS: 25
RAD ONC MSQ PRESCRIBED TECHNIQUE: NORMAL
RAD ONC MSQ PRESCRIBED TOTAL DOSE: 6125 CGRAY
RAD ONC MSQ PRESCRIPTION PATTERN COMMENT: NORMAL
RAD ONC MSQ START DATE: NORMAL
RAD ONC MSQ TREATMENT COURSE NUMBER: 1
RAD ONC MSQ TREATMENT SITE: NORMAL

## 2023-10-04 PROCEDURE — 77385 HC INTENSITY-MODULATED RADIATION THERAPY (IMRT), SIMPLE: CPT | Mod: PO | Performed by: RADIOLOGY

## 2023-10-04 PROCEDURE — 77014 CHG CT GUIDANCE RADIATION THERAPY FLDS PLACEMENT: CPT | Performed by: RADIOLOGY

## 2023-10-05 ENCOUNTER — HOSPITAL ENCOUNTER (OUTPATIENT)
Dept: RADIATION ONCOLOGY | Facility: CLINIC | Age: 83
Setting detail: RADIATION/ONCOLOGY SERIES
Discharge: STILL ADMITTED OR ADMINISTRATIVE | End: 2023-10-05
Payer: MEDICARE

## 2023-10-05 PROCEDURE — 77385 HC INTENSITY-MODULATED RADIATION THERAPY (IMRT), SIMPLE: CPT | Mod: PO | Performed by: RADIOLOGY

## 2023-10-05 PROCEDURE — 77014 CHG CT GUIDANCE RADIATION THERAPY FLDS PLACEMENT: CPT | Performed by: RADIOLOGY

## 2023-10-06 ENCOUNTER — HOSPITAL ENCOUNTER (OUTPATIENT)
Dept: RADIATION ONCOLOGY | Facility: CLINIC | Age: 83
Setting detail: RADIATION/ONCOLOGY SERIES
Discharge: STILL ADMITTED OR ADMINISTRATIVE | End: 2023-10-06
Payer: MEDICARE

## 2023-10-06 ENCOUNTER — RADIATION ONCOLOGY OTV (OUTPATIENT)
Dept: RADIATION ONCOLOGY | Facility: CLINIC | Age: 83
End: 2023-10-06
Payer: MEDICARE

## 2023-10-06 VITALS
RESPIRATION RATE: 18 BRPM | BODY MASS INDEX: 35.48 KG/M2 | HEART RATE: 63 BPM | OXYGEN SATURATION: 93 % | DIASTOLIC BLOOD PRESSURE: 78 MMHG | SYSTOLIC BLOOD PRESSURE: 149 MMHG | TEMPERATURE: 96.8 F | WEIGHT: 181.66 LBS

## 2023-10-06 DIAGNOSIS — Z51.0 ENCOUNTER FOR ANTINEOPLASTIC RADIATION THERAPY: ICD-10-CM

## 2023-10-06 DIAGNOSIS — C77.3 SECONDARY AND UNSPECIFIED MALIGNANT NEOPLASM OF AXILLA AND UPPER LIMB LYMPH NODES (MULTI): ICD-10-CM

## 2023-10-06 DIAGNOSIS — C50.811 MALIGNANT NEOPLASM OF OVERLAPPING SITES OF RIGHT FEMALE BREAST (MULTI): ICD-10-CM

## 2023-10-06 LAB
RAD ONC MSQ ACTUAL FRACTIONS DELIVERED: 19
RAD ONC MSQ ACTUAL SESSION DELIVERED DOSE: 245 CGRAY
RAD ONC MSQ ACTUAL TOTAL DOSE: 4655 CGRAY
RAD ONC MSQ ELAPSED DAYS: 24
RAD ONC MSQ LAST DATE: NORMAL
RAD ONC MSQ PRESCRIBED FRACTIONAL DOSE: 245 CGRAY
RAD ONC MSQ PRESCRIBED NUMBER OF FRACTIONS: 25
RAD ONC MSQ PRESCRIBED TECHNIQUE: NORMAL
RAD ONC MSQ PRESCRIBED TOTAL DOSE: 6125 CGRAY
RAD ONC MSQ PRESCRIPTION PATTERN COMMENT: NORMAL
RAD ONC MSQ START DATE: NORMAL
RAD ONC MSQ TREATMENT COURSE NUMBER: 1
RAD ONC MSQ TREATMENT SITE: NORMAL

## 2023-10-06 PROCEDURE — 77014 CHG CT GUIDANCE RADIATION THERAPY FLDS PLACEMENT: CPT | Performed by: RADIOLOGY

## 2023-10-06 PROCEDURE — 77385 HC INTENSITY-MODULATED RADIATION THERAPY (IMRT), SIMPLE: CPT | Mod: PO | Performed by: RADIOLOGY

## 2023-10-06 PROCEDURE — 77427 RADIATION TX MANAGEMENT X5: CPT | Performed by: RADIOLOGY

## 2023-10-06 NOTE — PROGRESS NOTES
Radiation Oncology On Treatment Visit    Patient Name:  Allyson Granados  MRN:  [id number]  :  1940    Referring Provider: No ref. provider found  Primary Care Provider: KELLI Maciel  Care Team: Patient Care Team:  KELLI Maciel as PCP - General (Family Medicine)  KELLI Maciel as PCP - [facility] Attributed Provider    Date of Service: 10/6/2023     Diagnosis:   Specialty Problems          Radiation Oncology Problems    Breast cancer (CMS/HCC)        Ductal carcinoma of left breast, stage 3 (CMS/HCC)         Treatment Summary:  Radiation Treatments       Active   Left breast CNI (Started on 2023)   Most recent fraction: 245 cGy given on 10/6/2023   Total given: 4,655 cGy / 6,125 cGy  (19 of 25 fractions)   Elapsed Days: 25   Technique: VMAT                   SUBJECTIVE: Having pain in the lateral breast.  Gave Speedy cools to try.        OBJECTIVE:   Vital Signs:  /78 (BP Location: Other (Comment), Patient Position: Sitting, BP Cuff Size: Small adult) Comment (BP Location): rt. wrist  Pulse 63   Temp 36 °C (96.8 °F) (Temporal)   Resp 18   Wt 82.4 kg (181 lb 10.5 oz)   SpO2 93%   BMI 35.48 kg/m²    Pain Scale: The patient's current pain level was assessed.  They report currently having a pain of 7 out of 10.    Other Pertinent Findings: Mild erythema, skin intact.      Toxicity Assessment          10/6/2023    10:06   Toxicity Assessment   Adverse Events Reviewed (WDL) Yes (Within Defined Limits)   Treatment Site Breast   Dermatitis Radiation Grade 1       with itching   Fatigue Grade 1   Nausea Grade 0   Pain Grade 2       7/10 left breast   Vomiting Grade 0   Abdominal Pain Grade 0   Esophagitis Grade 0   Dry Mouth Grade 0          Assessment / Plan:  The patient is tolerating radiation therapy as anticipated.  Continue per current treatment plan.   Chart and films reviewed and approved.

## 2023-10-09 ENCOUNTER — HOSPITAL ENCOUNTER (OUTPATIENT)
Dept: RADIATION ONCOLOGY | Facility: CLINIC | Age: 83
Setting detail: RADIATION/ONCOLOGY SERIES
Discharge: STILL ADMITTED OR ADMINISTRATIVE | End: 2023-10-09
Payer: MEDICARE

## 2023-10-09 DIAGNOSIS — Z51.0 ENCOUNTER FOR ANTINEOPLASTIC RADIATION THERAPY: ICD-10-CM

## 2023-10-09 DIAGNOSIS — C77.3 SECONDARY AND UNSPECIFIED MALIGNANT NEOPLASM OF AXILLA AND UPPER LIMB LYMPH NODES (MULTI): ICD-10-CM

## 2023-10-09 DIAGNOSIS — C50.811 MALIGNANT NEOPLASM OF OVERLAPPING SITES OF RIGHT FEMALE BREAST (MULTI): ICD-10-CM

## 2023-10-09 PROCEDURE — 77336 RADIATION PHYSICS CONSULT: CPT | Mod: PO | Performed by: RADIOLOGY

## 2023-10-09 PROCEDURE — 77014 CHG CT GUIDANCE RADIATION THERAPY FLDS PLACEMENT: CPT | Performed by: RADIOLOGY

## 2023-10-09 PROCEDURE — 77385 HC INTENSITY-MODULATED RADIATION THERAPY (IMRT), SIMPLE: CPT | Mod: PO | Performed by: RADIOLOGY

## 2023-10-10 LAB
RAD ONC MSQ ACTUAL FRACTIONS DELIVERED: 21
RAD ONC MSQ ACTUAL SESSION DELIVERED DOSE: 245 CGRAY
RAD ONC MSQ ACTUAL TOTAL DOSE: 5145 CGRAY
RAD ONC MSQ ELAPSED DAYS: 28
RAD ONC MSQ LAST DATE: NORMAL
RAD ONC MSQ PRESCRIBED FRACTIONAL DOSE: 245 CGRAY
RAD ONC MSQ PRESCRIBED NUMBER OF FRACTIONS: 25
RAD ONC MSQ PRESCRIBED TECHNIQUE: NORMAL
RAD ONC MSQ PRESCRIBED TOTAL DOSE: 6125 CGRAY
RAD ONC MSQ PRESCRIPTION PATTERN COMMENT: NORMAL
RAD ONC MSQ START DATE: NORMAL
RAD ONC MSQ TREATMENT COURSE NUMBER: 1
RAD ONC MSQ TREATMENT SITE: NORMAL

## 2023-10-10 PROCEDURE — 77014 CHG CT GUIDANCE RADIATION THERAPY FLDS PLACEMENT: CPT | Performed by: RADIOLOGY

## 2023-10-10 PROCEDURE — 77385 HC INTENSITY-MODULATED RADIATION THERAPY (IMRT), SIMPLE: CPT | Mod: PO | Performed by: RADIOLOGY

## 2023-10-11 ENCOUNTER — HOSPITAL ENCOUNTER (OUTPATIENT)
Dept: RADIATION ONCOLOGY | Facility: CLINIC | Age: 83
Setting detail: RADIATION/ONCOLOGY SERIES
Discharge: STILL ADMITTED OR ADMINISTRATIVE | End: 2023-10-11
Payer: MEDICARE

## 2023-10-11 DIAGNOSIS — C50.811 MALIGNANT NEOPLASM OF OVERLAPPING SITES OF RIGHT FEMALE BREAST (MULTI): ICD-10-CM

## 2023-10-11 DIAGNOSIS — Z51.0 ENCOUNTER FOR ANTINEOPLASTIC RADIATION THERAPY: ICD-10-CM

## 2023-10-11 DIAGNOSIS — C77.3 SECONDARY AND UNSPECIFIED MALIGNANT NEOPLASM OF AXILLA AND UPPER LIMB LYMPH NODES (MULTI): ICD-10-CM

## 2023-10-11 LAB
RAD ONC MSQ ACTUAL FRACTIONS DELIVERED: 22
RAD ONC MSQ ACTUAL SESSION DELIVERED DOSE: 245 CGRAY
RAD ONC MSQ ACTUAL TOTAL DOSE: 5390 CGRAY
RAD ONC MSQ ELAPSED DAYS: 29
RAD ONC MSQ LAST DATE: NORMAL
RAD ONC MSQ PRESCRIBED FRACTIONAL DOSE: 245 CGRAY
RAD ONC MSQ PRESCRIBED NUMBER OF FRACTIONS: 25
RAD ONC MSQ PRESCRIBED TECHNIQUE: NORMAL
RAD ONC MSQ PRESCRIBED TOTAL DOSE: 6125 CGRAY
RAD ONC MSQ PRESCRIPTION PATTERN COMMENT: NORMAL
RAD ONC MSQ START DATE: NORMAL
RAD ONC MSQ TREATMENT COURSE NUMBER: 1
RAD ONC MSQ TREATMENT SITE: NORMAL

## 2023-10-11 PROCEDURE — 77385 HC INTENSITY-MODULATED RADIATION THERAPY (IMRT), SIMPLE: CPT | Mod: PO | Performed by: RADIOLOGY

## 2023-10-11 PROCEDURE — 77014 CHG CT GUIDANCE RADIATION THERAPY FLDS PLACEMENT: CPT | Performed by: RADIOLOGY

## 2023-10-12 ENCOUNTER — HOSPITAL ENCOUNTER (OUTPATIENT)
Dept: RADIATION ONCOLOGY | Facility: CLINIC | Age: 83
Setting detail: RADIATION/ONCOLOGY SERIES
Discharge: STILL ADMITTED OR ADMINISTRATIVE | End: 2023-10-12
Payer: MEDICARE

## 2023-10-12 DIAGNOSIS — C50.811 MALIGNANT NEOPLASM OF OVERLAPPING SITES OF RIGHT FEMALE BREAST (MULTI): ICD-10-CM

## 2023-10-12 DIAGNOSIS — Z51.0 ENCOUNTER FOR ANTINEOPLASTIC RADIATION THERAPY: ICD-10-CM

## 2023-10-12 DIAGNOSIS — C77.3 SECONDARY AND UNSPECIFIED MALIGNANT NEOPLASM OF AXILLA AND UPPER LIMB LYMPH NODES (MULTI): ICD-10-CM

## 2023-10-12 LAB
RAD ONC MSQ ACTUAL FRACTIONS DELIVERED: 23
RAD ONC MSQ ACTUAL SESSION DELIVERED DOSE: 245 CGRAY
RAD ONC MSQ ACTUAL TOTAL DOSE: 5635 CGRAY
RAD ONC MSQ ELAPSED DAYS: 30
RAD ONC MSQ LAST DATE: NORMAL
RAD ONC MSQ PRESCRIBED FRACTIONAL DOSE: 245 CGRAY
RAD ONC MSQ PRESCRIBED NUMBER OF FRACTIONS: 25
RAD ONC MSQ PRESCRIBED TECHNIQUE: NORMAL
RAD ONC MSQ PRESCRIBED TOTAL DOSE: 6125 CGRAY
RAD ONC MSQ PRESCRIPTION PATTERN COMMENT: NORMAL
RAD ONC MSQ START DATE: NORMAL
RAD ONC MSQ TREATMENT COURSE NUMBER: 1
RAD ONC MSQ TREATMENT SITE: NORMAL

## 2023-10-12 PROCEDURE — 77014 CHG CT GUIDANCE RADIATION THERAPY FLDS PLACEMENT: CPT | Performed by: RADIOLOGY

## 2023-10-12 PROCEDURE — 77385 HC INTENSITY-MODULATED RADIATION THERAPY (IMRT), SIMPLE: CPT | Mod: PO | Performed by: RADIOLOGY

## 2023-10-13 ENCOUNTER — RADIATION ONCOLOGY OTV (OUTPATIENT)
Dept: RADIATION ONCOLOGY | Facility: CLINIC | Age: 83
End: 2023-10-13
Payer: MEDICARE

## 2023-10-13 ENCOUNTER — HOSPITAL ENCOUNTER (OUTPATIENT)
Dept: RADIATION ONCOLOGY | Facility: CLINIC | Age: 83
Setting detail: RADIATION/ONCOLOGY SERIES
Discharge: STILL ADMITTED OR ADMINISTRATIVE | End: 2023-10-13
Payer: MEDICARE

## 2023-10-13 VITALS
BODY MASS INDEX: 35.69 KG/M2 | WEIGHT: 182.76 LBS | DIASTOLIC BLOOD PRESSURE: 75 MMHG | OXYGEN SATURATION: 97 % | SYSTOLIC BLOOD PRESSURE: 177 MMHG | HEART RATE: 67 BPM | TEMPERATURE: 96.8 F | RESPIRATION RATE: 18 BRPM

## 2023-10-13 DIAGNOSIS — Z51.0 ENCOUNTER FOR ANTINEOPLASTIC RADIATION THERAPY: ICD-10-CM

## 2023-10-13 DIAGNOSIS — C50.811 MALIGNANT NEOPLASM OF OVERLAPPING SITES OF RIGHT FEMALE BREAST (MULTI): ICD-10-CM

## 2023-10-13 DIAGNOSIS — C77.3 SECONDARY AND UNSPECIFIED MALIGNANT NEOPLASM OF AXILLA AND UPPER LIMB LYMPH NODES (MULTI): ICD-10-CM

## 2023-10-13 LAB
RAD ONC MSQ ACTUAL FRACTIONS DELIVERED: 24
RAD ONC MSQ ACTUAL SESSION DELIVERED DOSE: 245 CGRAY
RAD ONC MSQ ACTUAL TOTAL DOSE: 5880 CGRAY
RAD ONC MSQ ELAPSED DAYS: 31
RAD ONC MSQ LAST DATE: NORMAL
RAD ONC MSQ PRESCRIBED FRACTIONAL DOSE: 245 CGRAY
RAD ONC MSQ PRESCRIBED NUMBER OF FRACTIONS: 25
RAD ONC MSQ PRESCRIBED TECHNIQUE: NORMAL
RAD ONC MSQ PRESCRIBED TOTAL DOSE: 6125 CGRAY
RAD ONC MSQ PRESCRIPTION PATTERN COMMENT: NORMAL
RAD ONC MSQ START DATE: NORMAL
RAD ONC MSQ TREATMENT COURSE NUMBER: 1
RAD ONC MSQ TREATMENT SITE: NORMAL

## 2023-10-13 PROCEDURE — 77427 RADIATION TX MANAGEMENT X5: CPT | Performed by: STUDENT IN AN ORGANIZED HEALTH CARE EDUCATION/TRAINING PROGRAM

## 2023-10-13 PROCEDURE — 77014 CHG CT GUIDANCE RADIATION THERAPY FLDS PLACEMENT: CPT | Performed by: STUDENT IN AN ORGANIZED HEALTH CARE EDUCATION/TRAINING PROGRAM

## 2023-10-13 PROCEDURE — 77385 HC INTENSITY-MODULATED RADIATION THERAPY (IMRT), SIMPLE: CPT | Mod: PO | Performed by: RADIOLOGY

## 2023-10-13 NOTE — PROGRESS NOTES
Radiation Oncology On Treatment Visit    Patient Name:  Allyson Granados  MRN:  [id number]  :  1940    Referring Provider: No ref. provider found  Primary Care Provider: KELLI Maciel  Care Team: Patient Care Team:  KELLI Maciel as PCP - General (Family Medicine)  KELLI Maciel as PCP - [facility] Attributed Provider    Date of Service: 10/13/2023     Diagnosis:   Specialty Problems          Radiation Oncology Problems    Breast cancer (CMS/HCC)        Ductal carcinoma of left breast, stage 3 (CMS/HCC)         Treatment Summary:  Site                             Start Tx Last Tx              ED Frac Dose                       FxDose Technique  Rx:Left breast CNI 2023 10/13/2023 31  5,880/6,125cGy 245cGy VMAT    SUBJECTIVE: patient has hx of chronic back pain that is overseen by pain management.      OBJECTIVE:   Vital Signs:  /75 (BP Location: Right arm, Patient Position: Sitting, BP Cuff Size: Child)   Pulse 67   Temp 36 °C (96.8 °F) (Temporal)   Resp 18   Wt 82.9 kg (182 lb 12.2 oz)   SpO2 97%   BMI 35.69 kg/m²    Pain Scale: The patient's current pain level was assessed.  They report currently having a pain of 6 out of 10.    Other Pertinent Findings:     Toxicity Assessment          10/6/2023    10:06 10/13/2023    10:36   Toxicity Assessment   Adverse Events Reviewed (WDL) Yes (Within Defined Limits) No (Exceptions to WDL)   Treatment Site Breast Breast   Anorexia  Grade 0   Anxiety  Grade 0   Dehydration  Grade 0   Depression  Grade 0   Dermatitis Radiation Grade 1       with itching --        intermittient itching.  using aquaphor daily   Diarrhea  Grade 0   Fatigue Grade 1 Grade 1       mild fatigue. tolerating ADLs.  resting PRN   Fibrosis Deep Connective Tissue  Grade 0   Fracture  Grade 0   Nausea Grade 0 Grade 0   Pain Grade 2       7/10 left qmt1fed Grade 1       left axilla.  takes hydrocodone for back pain   Treatment Related Secondary  Malignancy  Grade 0   Tumor Pain  Grade 0   Vomiting Grade 0 Grade 0   Abdominal Pain Grade 0    Esophagitis Grade 0    Dry Mouth Grade 0 Grade 0   Breast Infection  Grade 0   Joint Range of Motion Decreased  Grade 0   Joint Range of Motion Decreased Lumbar Spine  Grade 0   Brachial Plexopathy  Grade 0   Breast Atrophy  Grade 0   Breast Pain  Grade 0   Nipple Deformity  Grade 0   Pneumonitis  Grade 0   Edema Limbs  Grade 0   Lymphedema  Grade 0   Thromboembolic Event  Grade 0   Hot Flashes  Grade 0     Concurrent systemic therapy: None.    Labs:   WBC   Date Value Ref Range Status   07/19/2023 8.2 4.4 - 11.3 x10E9/L Final   07/19/2023 CANCELED       Comment:     Result canceled by the ancillary.   05/01/2023 CANCELED       Comment:     Result canceled by the ancillary.     Hemoglobin   Date Value Ref Range Status   07/19/2023 14.3 12.0 - 16.0 g/dL Final   07/19/2023 CANCELED       Comment:     Result canceled by the ancillary.   05/01/2023 CANCELED       Comment:     Result canceled by the ancillary.     Hematocrit   Date Value Ref Range Status   07/19/2023 46.1 (H) 36.0 - 46.0 % Final   07/19/2023 CANCELED       Comment:     Result canceled by the ancillary.   05/01/2023 CANCELED       Comment:     Result canceled by the ancillary.     Neutrophils Absolute   Date Value Ref Range Status   07/19/2023 5.13 1.60 - 5.50 x10E9/L Final   07/19/2023 CANCELED       Comment:     Result canceled by the ancillary.   02/13/2023 6.22 (H) 1.60 - 5.50 x10E9/L Final     Platelets   Date Value Ref Range Status   07/19/2023 311 150 - 450 x10E9/L Final   07/19/2023 CANCELED       Comment:     Result canceled by the ancillary.   05/01/2023 CANCELED       Comment:     Result canceled by the ancillary.     Creatinine   Date Value Ref Range Status   07/19/2023 1.20 (H) 0.50 - 1.05 mg/dL Final   07/19/2023 CANCELED       Comment:     Result canceled by the ancillary.   05/01/2023 CANCELED       Comment:     Result canceled by the  ancillary.     Alkaline Phosphatase   Date Value Ref Range Status   04/29/2022 35 33 - 136 U/L Final   10/31/2021 28 (L) 33 - 136 U/L Final   10/29/2021 40 33 - 136 U/L Final     AST   Date Value Ref Range Status   04/29/2022 14 9 - 39 U/L Final   10/31/2021 15 9 - 39 U/L Final   10/29/2021 16 9 - 39 U/L Final     ALT (SGPT)   Date Value Ref Range Status   04/29/2022 12 7 - 45 U/L Final     Comment:      Patients treated with Sulfasalazine may generate    falsely decreased results for ALT.     10/31/2021 11 7 - 45 U/L Final     Comment:      Patients treated with Sulfasalazine may generate    falsely decreased results for ALT.     10/29/2021 13 7 - 45 U/L Final     Comment:      Patients treated with Sulfasalazine may generate    falsely decreased results for ALT.       Total Bilirubin   Date Value Ref Range Status   04/29/2022 0.6 0.0 - 1.2 mg/dL Final   10/31/2021 0.8 0.0 - 1.2 mg/dL Final   10/29/2021 0.4 0.0 - 1.2 mg/dL Final         Exam: [Grade 1 radiation dermatitis of left breast, axilla and neck            Assessment / Plan:  The patient is tolerating radiation therapy as anticipated.  Continue per current treatment plan.         Therapies: Continue topical. Pain management hydrocodone for axilla and back pains.      Side effects reviewed with patient. Images, chart and labs reviewed.    Follow up in 1 month post-radiation.    Humberto Laughlin MD

## 2023-10-16 ENCOUNTER — HOSPITAL ENCOUNTER (OUTPATIENT)
Dept: RADIATION ONCOLOGY | Facility: CLINIC | Age: 83
Setting detail: RADIATION/ONCOLOGY SERIES
Discharge: STILL ADMITTED OR ADMINISTRATIVE | End: 2023-10-16
Payer: MEDICARE

## 2023-10-16 PROCEDURE — 77014 CHG CT GUIDANCE RADIATION THERAPY FLDS PLACEMENT: CPT | Performed by: RADIOLOGY

## 2023-10-16 PROCEDURE — 77385 HC INTENSITY-MODULATED RADIATION THERAPY (IMRT), SIMPLE: CPT | Mod: PO | Performed by: RADIOLOGY

## 2023-10-17 ENCOUNTER — DOCUMENTATION (OUTPATIENT)
Dept: RADIATION ONCOLOGY | Facility: CLINIC | Age: 83
End: 2023-10-17
Payer: MEDICARE

## 2023-10-17 NOTE — PROGRESS NOTES
Radiation Oncology Treatment Summary    Patient Name:  Allyson Granados  MRN:  [id number]  :  1940    Referring Provider: No ref. provider found  Primary Care Provider: KELLI Maciel    Brief History: Allyson Granados is a 83 y.o. female with No matching staging information was found for the patient..  The patient completed radiotherapy as outlined below.    Radiation Treatment Summary:      The left breast and comprehensive troy areas received a dose of 5000 cGy/200 cGy per fraction/25 fractions with a simultaneous integrated boost to the PET positive nodes of 6125 cGy/245 cGy per fraction/25 fractions prescribed to the 96% isodose line using 6 MV photons treated via a VMAT technique.  The ABC device was used to provide cardiac sparing.    Radiation Treatments       Active   Left breast CNI (Started on 2023)   Most recent fraction: 245 cGy given on 10/16/2023   Total given: 6,125 cGy / 6,125 cGy  (25 of 25 fractions)   Elapsed Days: 34   Technique: VMAT                   Concurrent Chemotherapy:   Treatment Plans       No treatment plans exist          CTCAE Toxicity Overview:   Toxicity Assessment          10/6/2023    10:06 10/13/2023    10:36   Toxicity Assessment   Adverse Events Reviewed (WDL) Yes (Within Defined Limits) No (Exceptions to WDL)   Treatment Site Breast Breast   Anorexia  Grade 0   Anxiety  Grade 0   Dehydration  Grade 0   Depression  Grade 0   Dermatitis Radiation Grade 1       with itching --        intermittient itching.  using aquaphor daily   Diarrhea  Grade 0   Fatigue Grade 1 Grade 1       mild fatigue. tolerating ADLs.  resting PRN   Fibrosis Deep Connective Tissue  Grade 0   Fracture  Grade 0   Nausea Grade 0 Grade 0   Pain Grade 2       7/10 left [id number] Grade 1       left axilla.  takes hydrocodone for back pain   Treatment Related Secondary Malignancy  Grade 0   Tumor Pain  Grade 0   Vomiting Grade 0 Grade 0   Abdominal Pain Grade 0    Esophagitis Grade 0    Dry Mouth  Grade 0 Grade 0   Breast Infection  Grade 0   Seroma  Grade 0   Joint Range of Motion Decreased  Grade 0   Joint Range of Motion Decreased Lumbar Spine  Grade 0   Brachial Plexopathy  Grade 0   Breast Atrophy  Grade 0   Breast Pain  Grade 0   Nipple Deformity  Grade 0   Pneumonitis  Grade 0   Edema Limbs  Grade 0   Lymphedema  Grade 0   Thromboembolic Event  Grade 0   Hot Flashes  Grade 0     Patient Disposition: Patient will return to clinic to see Emy Anthony CNP on 12/8/2023.

## 2023-10-19 ENCOUNTER — OFFICE VISIT (OUTPATIENT)
Dept: PAIN MEDICINE | Facility: CLINIC | Age: 83
End: 2023-10-19
Payer: MEDICARE

## 2023-10-19 DIAGNOSIS — M51.26 HERNIATED NUCLEUS PULPOSUS, L4-5 RIGHT: ICD-10-CM

## 2023-10-19 DIAGNOSIS — M47.816 SPONDYLOSIS OF LUMBAR REGION WITHOUT MYELOPATHY OR RADICULOPATHY: ICD-10-CM

## 2023-10-19 DIAGNOSIS — M51.27 HERNIATED NUCLEUS PULPOSUS, L5-S1, RIGHT: Primary | ICD-10-CM

## 2023-10-19 DIAGNOSIS — M96.1 FAILED BACK SYNDROME OF LUMBAR SPINE: ICD-10-CM

## 2023-10-19 PROCEDURE — 1126F AMNT PAIN NOTED NONE PRSNT: CPT | Performed by: PHYSICAL MEDICINE & REHABILITATION

## 2023-10-19 PROCEDURE — 1159F MED LIST DOCD IN RCRD: CPT | Performed by: PHYSICAL MEDICINE & REHABILITATION

## 2023-10-19 PROCEDURE — 99214 OFFICE O/P EST MOD 30 MIN: CPT | Performed by: PHYSICAL MEDICINE & REHABILITATION

## 2023-10-19 RX ORDER — HYDROCODONE BITARTRATE AND ACETAMINOPHEN 10; 325 MG/1; MG/1
1 TABLET ORAL 3 TIMES DAILY
Qty: 84 TABLET | Refills: 0 | Status: SHIPPED | OUTPATIENT
Start: 2023-10-19 | End: 2023-11-16

## 2023-10-19 RX ORDER — HYDROCODONE BITARTRATE AND ACETAMINOPHEN 10; 325 MG/1; MG/1
1 TABLET ORAL 3 TIMES DAILY
Qty: 84 TABLET | Refills: 0 | Status: SHIPPED | OUTPATIENT
Start: 2023-11-16 | End: 2023-12-19 | Stop reason: SDUPTHER

## 2023-10-19 NOTE — PROGRESS NOTES
Chief complaint  Back and lower   Recurrent breast carcinoma    History  Ms Granados returned today for follow up office visit   She is getting radiation therapy      she continues to have the pain in the lower back. The lower back pain is mechanical in characteristics , continuous and gets worse with movements mainly with forward flexion and bending. The pain is radiating to the right lower limb on the lateral aspect of the right lower limb that is reaching the to the ankle to the dorsum of the foot.      The pain in the back of the lower limbs is worst. that is burning and tingling on a continuous fashion. the pain goes in aggravation intermittently with sharp lancinating, electrical like jolts and sensations. These are felt on the skin and deeper in the tissues. The pain is affected with colder weather and with wet and humid conditions.   The pain is interfering with activities of daily living, quality of life and quality of sleep. It is limiting the functions and everything takes longer to complete because of the slowing related to the pain. Movements are cautious to avoid aggravation of the symptoms.      She is on schedule with the pain medications and using those as prescribed for the pain control. Denied euphoria associated with the use of the pain medications.   the pain is rated at 7/10 without the medications. But, with the pain medications the pain level drops to 4/10.      opioids treatment agreement renewed in February 2023   Oarrs pulled and scanned in the chart no concerns . .she realizes the interaction between the therapeutic classes including the respiratory depression and potential death   last urine toxicology testing in May 2023 that is compatible with the medications prescribed   Xray updated lumbar spine   ORT Score is 0  Pain pathology and pain generators lumbar spine and intervertebral disc and post op changes  Modalities tried surgeries epidural steroid injections facet blocks and  radiofrequency ablation and pain medications non opioids      The control of the pain with the pain medications is helping the control of the symptoms and allowing the function and activities of daily living, enjoyment of life, quality of life and sleep with less interruption by the pain. The goal is symptomatic control of the nonmalignant chronic pain and not to repair the permanent damage in the tissues inducing the chronic pain conditions. We are aiming to shift the focus from the nonmalignant chronic pain to other aspects of life by symptomatically treating this chronic pain.      Review of Systems  Denied any fever or chills. No weight loss and no night sweats. No cough or sputum production. No diarrhea   The constipation has been responding to fibers and over the counter medications.      No bladder and bowel incontinence and no other changes in bladder and bowel. No skin changes. Reports tiredness and fatigability only if the pain is not controlled.   Denied opioids diversion and abuse and denies alcoholism. Denies overuse of the pain medications.     Physical examination  declined Chaperone for the visit and was adequately  draped for the exam.  Awake, alert and oriented x 3 Pupils are equal, midsize, reactive to light and accommodations.      she is limping on the right side with shortened perimeter of stance on the right side     Straight leg raising is at 50 Â° but pain only in the lower back she uses a cane for balance and support      minimal pain inhibition around hip flexion and abduction decreased sensory to the light touch over the lateral legs bilaterally      pain inhibition of the hips Manual Muscle strength testing because of the pain in the lower back . The endurance was decreased even though the initial resistance was 5/5 but could not keep beyond initial resistance.      Dulce testing are negative for axial loading and log rotation to either side. she uses a cane for balance. No pain  behavior      skin in intact in the feet. cap refill of 2 second and palpable pedal pulses   nares are clear . 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 I also explained about the cumulative effects on the organs and mainly the liver.     discussed with her about spinal cord stimulator and she wanted to hold off      Continue with the treatment plan since that is working and controlling the symptoms. the UDS and Oarrs are compliant. No reported side effects and the daily functions and activities of daily living are improved with the pain control. Additionally other modalities including interventional and non opioids modalities were tried without relief to allow improvement of the activities of daily living , quality of life and quality of sleep.      Given the opioids therapy , we discussed about the risk for accidental over dose on the pain medications. I went over the mechanism of action and mode of use of the Naloxone according to the  recommendations.      We went over the COVID19 precautions and importance of following recommendations including social distancing and hand washing and avoiding unnecessary trips and going out of the house       we had a long discussion that the goal is to keep the pain medications to a minimum or possibly come off the medications completely  continue with voltaren gel for opioids sparing effect      discussed with her about spinal cord stimulator We discussed the risks and benefits of the injections including but not limited to nerve injury, infection, bleeding , headaches and paralysis and remotely death.      UDS for compliance check, even with low risk for OUD but we still need the UDS for compliance and confirmation.   long discussion for 10 minutes, above and beyond the office visit, about opioids tolerance and opioids receptors regulation and drug holidays to help against that.       I recommended cutting back on the pain medications. I explained my rationale as before. Previous trials on cutting back on the medications led to increase pain. We are going to keep trying and encouraging a decrease on the dosing of the pain medications at every visit. and she will try cutting again in the summer time        Based on the above findings and the clinical response to the opioids medications and improvement of the activities of daily living, sleep, and work performance. We made this complex decision to continue the opioids therapy in light of the evidence of the patient's responsibility in using the pain medications as prescribed for the nonmalignant chronic pain condition. We discussed about the use of the pain medications to treat the symptoms of chronic nonmalignant pain and we are not trying the repair the permanent damage in the tissues, rather we are trying to control the symptoms induced by the permanent damage to the tissues inducing the chronic pain situation and resulting disability. I explained the difference and discussed it with the patient and stressed the importance of knowing the difference especially because of the side effects and the addicting and habit forming nature of the dangerous drugs we are using to treat the symptoms of the chronic pain.   The goal and rationale from our treatment with the chronic opioids therapy are to control  pain and alleviate the disability induced by the chronic nonmalignant pain condition after failure of other non-opioid and non-pharmacological modalities to treat the symptoms of the chronic pain.  I reviewed and reconciled the medications list.    The level of clinical decision making in this office visit, is high, given the high risks of complications with the morbidity and mortality due to the fact that acute and chronic pain may pose a threat to the life and bodily function, if under treated, poorly treated, or with failure to maintain adequate treatment and timely medical follow up. Additionally over treatment has its own set of complications including overdosing on the pain medications and also the habit forming effects of the medications used to treat chronic painful conditions including therapeutic classes classified as dangerous medications. Given the serious and fluctuating nature of pain with extensive consideration for whenever pain changes, there is always the risk of prolonged functional impairment requiring close patient assessment and reassessment and high level medical decision making at every office visit. The amount and complexity of data reviewed is high given the patient clinical presentation, labs,  data, radiology reports, and other tests as above. Pertinent data whether present or absent were taken in consideration in the process of making this high level medical decision.     We discussed that we are prescribing the medications on good miri and legitimate medical reason         We discussed that we are prescribing the medications on good miri and legitimate medical reason.       Follow up in 8 weeks or earlier if needed.

## 2023-10-30 ENCOUNTER — TELEPHONE (OUTPATIENT)
Dept: ADMISSION | Facility: HOSPITAL | Age: 83
End: 2023-10-30
Payer: MEDICARE

## 2023-10-30 NOTE — TELEPHONE ENCOUNTER
Pt completed radiation on October 16th, Wanting to know if she can move forward with flu shot and COVID shot. This RN will confirm with Dr. Bates however typically this is approved by provider. Will call daughter back once I have that information confirmed.

## 2023-10-30 NOTE — TELEPHONE ENCOUNTER
Per Dr. Bates, OK to get vaccinations. I let Chelsi know, she was appreciative with no further questions or concerns at this time.

## 2023-12-08 ENCOUNTER — HOSPITAL ENCOUNTER (OUTPATIENT)
Dept: RADIATION ONCOLOGY | Facility: CLINIC | Age: 83
Setting detail: RADIATION/ONCOLOGY SERIES
Discharge: HOME | End: 2023-12-08
Payer: MEDICARE

## 2023-12-08 VITALS
TEMPERATURE: 98.2 F | WEIGHT: 182.65 LBS | OXYGEN SATURATION: 95 % | HEART RATE: 70 BPM | SYSTOLIC BLOOD PRESSURE: 142 MMHG | RESPIRATION RATE: 18 BRPM | DIASTOLIC BLOOD PRESSURE: 96 MMHG | BODY MASS INDEX: 35.67 KG/M2

## 2023-12-08 DIAGNOSIS — C50.919 MALIGNANT NEOPLASM OF FEMALE BREAST, UNSPECIFIED ESTROGEN RECEPTOR STATUS, UNSPECIFIED LATERALITY, UNSPECIFIED SITE OF BREAST (MULTI): ICD-10-CM

## 2023-12-08 DIAGNOSIS — C50.912: Primary | ICD-10-CM

## 2023-12-08 PROCEDURE — 99214 OFFICE O/P EST MOD 30 MIN: CPT | Mod: PO | Performed by: NURSE PRACTITIONER

## 2023-12-08 PROCEDURE — 99214 OFFICE O/P EST MOD 30 MIN: CPT | Performed by: NURSE PRACTITIONER

## 2023-12-08 ASSESSMENT — PAIN SCALES - GENERAL: PAINLEVEL: 7

## 2023-12-08 NOTE — PROGRESS NOTES
Radiation Oncology Follow-Up    Patient Name:  Allyson Granados  MRN:  [id number]  :  1940    Referring Provider: No ref. 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   Interval History:    83-year-old female who appreciated a mass in her left breast.  She underwent a mammogram on 2023 which revealed an irregular spiculated mass  in the upper outer quadrant of the left breast as well as a prominent left axillary lymph node.  Ultrasound directed at the 3 o'clock position, 6 cm from the nipple revealed a 2.1 x 1 x 1.7 cm hypoechoic mass.  Axillary ultrasound revealed a 1.7 x 0.9  x 1.5 cm lymph node.      She underwent a left breast core biopsy on 2023 which revealed invasive ductal carcinoma, grade 3.  Estrogen receptors were negative.  Progesterone receptors stained positive at 5 to 10%.  HER2/jose was 1+ IHC.  Lymph node biopsy  revealed adenocarcinoma.      She underwent a PET scan on 2023 which revealed a left breast mass and a left axillary lymph node with no evidence of metastatic disease.      On 2023 she underwent a left partial mastectomy with sentinel lymph node biopsy.   Pathology revealed a 2.1 cm invasive ductal carcinoma, grade 3.  The tumor was indeterminant for lymphovascular invasion.  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  There was no element of extensive intraductal component.   The resection margins were negative.  4 sentinel lymph nodes were  removed, 1 of which contained metastatic disease measuring 1.6 cm with extranodal extension and tumor deposits in the perinodal adipose tissue.      She saw Dr. Bates and given her other  comorbidities has decided not to proceed with chemotherapy    9/12/23 -10/16/23: Radiation therapy to the left breast and comprehensive lymph nodes consisting of a dose of 61.25 Gy given in 25 fractions of 2.45 Gy per fraction.  ABC used for cardiac sparing.     SUBJECTIVE  History of Present Illness:   Allyson Granados is here with her dtr today for routine radiation follow up/initial radiation survivorship visit.  She is doing well and reports breast has healed well. She endorses low energy and her appetite is diminished because of metallic taste with most foods.  She lives with her son and tries to be active.   Denies headaches, fever, chills, cough, SOB, chest pain, N/V or bony pain.    Review of Systems:    Review of Systems   All other systems reviewed and are negative.      Performance Status:   The Karnofsky performance scale today is 80, Normal activity with effort; some signs or symptoms of disease (ECOG equivalent 1).        OBJECTIVE  Vital Signs:  BP (!) 195/93 (BP Location: Right arm, Patient Position: Sitting, BP Cuff Size: Adult)   Pulse 70   Temp 36.8 °C (98.2 °F) (Core)   Resp 18   Wt 82.9 kg (182 lb 10.4 oz)   SpO2 95%   BMI 35.67 kg/m²      Current Outpatient Medications:     amiodarone (Pacerone) 200 mg tablet, Take 1 tablet (200 mg) by mouth once every day., Disp: , Rfl:     apixaban (Eliquis) 5 mg tablet, Eliquis 5 MG Oral Tablet  Quantity: 60  Refills: 0      Start : 30-Oct-2021  Active, Disp: , Rfl:     aspirin 81 mg chewable tablet, Chew 1 tablet (81 mg) once daily., Disp: , Rfl:     cholecalciferol (Vitamin D-3) 50 MCG (2000 UT) tablet, Take 1 tablet (50 mcg) by mouth once every day., Disp: , Rfl:     diclofenac sodium 1 % kit, Apply 2 inches to each knee and rub until gone. three times a day, Disp: ,  Rfl:     dilTIAZem ER (Tiazac) 120 mg 24 hr capsule, Take 1 capsule (120 mg) by mouth once daily., Disp: , Rfl:     furosemide (Lasix) 20 mg tablet, Take 1 tablet (20 mg) by mouth once daily., Disp: , Rfl:     HYDROcodone-acetaminophen (Norco)  mg tablet, Take 1 tablet by mouth 3 times a day for 28 days. Do not start before November 16, 2023., Disp: 84 tablet, Rfl: 0    levothyroxine (Synthroid, Levoxyl) 50 mcg tablet, Take 1 tablet (50 mcg) by mouth once daily., Disp: , Rfl:     losartan (Cozaar) 50 mg tablet, Take 1 tablet (50 mg) by mouth in the morning and 1 tablet (50 mg) before bedtime., Disp: , Rfl:     metoprolol succinate XL (Toprol-XL) 50 mg 24 hr tablet, Take 1 tablet (50 mg) by mouth once daily., Disp: , Rfl:     naloxone (Narcan) 4 mg/0.1 mL nasal spray, Administer into affected nostril(s). Spray one bottle into nostril while patient lay on their back, repeat if needed, Disp: , Rfl:     nystatin (Mycostatin) 100,000 unit/gram powder, Apply two to three times a day to affected area, Disp: , Rfl:     nystatin (Mycostatin) cream, Apply and rub in a thin film to affected areas twice daily ( am and pm), Disp: , Rfl:     omeprazole (PriLOSEC) 40 mg DR capsule, Take 1 capsule (40 mg) by mouth once daily., Disp: , Rfl:     potassium chloride CR 10 mEq ER tablet, TAKE 1 TABLET BY MOUTH EVERY DAY, Disp: 90 tablet, Rfl: 1    rOPINIRole (Requip) 4 mg tablet, Take 1 tablet (4 mg) by mouth in the morning and 1 tablet (4 mg) before bedtime., Disp: , Rfl:    Physical Exam:  Physical Exam  Constitutional:       Appearance: Normal appearance.   HENT:      Head: Normocephalic and atraumatic.      Nose: Nose normal.      Mouth/Throat:      Mouth: Mucous membranes are moist.      Pharynx: Oropharynx is clear.   Eyes:      Conjunctiva/sclera: Conjunctivae normal.      Pupils: Pupils are equal, round, and reactive to light.   Cardiovascular:      Rate and Rhythm: Normal rate.      Heart sounds: Normal heart sounds.    Pulmonary:      Effort: Pulmonary effort is normal.      Breath sounds: Normal breath sounds.   Chest:   Breasts:     Right: Normal. No inverted nipple, mass or nipple discharge.      Left: Normal. No inverted nipple, mass or nipple discharge.   Abdominal:      Palpations: Abdomen is soft.   Musculoskeletal:         General: Normal range of motion.      Cervical back: Normal range of motion.   Lymphadenopathy:      Cervical: No cervical adenopathy.      Upper Body:      Right upper body: No supraclavicular or axillary adenopathy.      Left upper body: No supraclavicular or axillary adenopathy.   Skin:     General: Skin is warm and dry.   Neurological:      General: No focal deficit present.      Mental Status: She is alert and oriented to person, place, and time.   Psychiatric:         Mood and Affect: Mood normal.         Behavior: Behavior normal.         ASSESSMENT/PLAN:  83 y.o. female with stage IIIA left breast cancer, multiple medical co-morbidities, s/p breast conserving surgery followed by radiation.  She seems to be recovering well and breast is healing well.  Reviewed skin care, possible late effects of treatment and follow up.  She will follow up with Dr. Bates's team in med onc and with Dr. Felton for mammograms.  Radiation follow up in 6 mo.  Call with any questions/concerns.     Emy Anthony CNP  [phone]

## 2023-12-14 ENCOUNTER — APPOINTMENT (OUTPATIENT)
Dept: PAIN MEDICINE | Facility: CLINIC | Age: 83
End: 2023-12-14
Payer: MEDICARE

## 2023-12-19 ENCOUNTER — OFFICE VISIT (OUTPATIENT)
Dept: PAIN MEDICINE | Facility: CLINIC | Age: 83
End: 2023-12-19
Payer: MEDICARE

## 2023-12-19 DIAGNOSIS — Z79.891 LONG TERM CURRENT USE OF OPIATE ANALGESIC: ICD-10-CM

## 2023-12-19 DIAGNOSIS — M47.816 SPONDYLOSIS OF LUMBAR REGION WITHOUT MYELOPATHY OR RADICULOPATHY: ICD-10-CM

## 2023-12-19 DIAGNOSIS — M51.26 HERNIATED NUCLEUS PULPOSUS, L4-5 RIGHT: Primary | ICD-10-CM

## 2023-12-19 DIAGNOSIS — M51.27 HERNIATED NUCLEUS PULPOSUS, L5-S1, RIGHT: ICD-10-CM

## 2023-12-19 PROCEDURE — 1159F MED LIST DOCD IN RCRD: CPT | Performed by: PHYSICAL MEDICINE & REHABILITATION

## 2023-12-19 PROCEDURE — 99214 OFFICE O/P EST MOD 30 MIN: CPT | Performed by: PHYSICAL MEDICINE & REHABILITATION

## 2023-12-19 PROCEDURE — 1125F AMNT PAIN NOTED PAIN PRSNT: CPT | Performed by: PHYSICAL MEDICINE & REHABILITATION

## 2023-12-19 RX ORDER — HYDROCODONE BITARTRATE AND ACETAMINOPHEN 10; 325 MG/1; MG/1
1 TABLET ORAL 3 TIMES DAILY
Qty: 84 TABLET | Refills: 0 | Status: SHIPPED | OUTPATIENT
Start: 2023-12-19 | End: 2024-01-11 | Stop reason: SDUPTHER

## 2023-12-19 RX ORDER — HYDROCODONE BITARTRATE AND ACETAMINOPHEN 10; 325 MG/1; MG/1
1 TABLET ORAL 3 TIMES DAILY
Qty: 84 TABLET | Refills: 0 | Status: SHIPPED | OUTPATIENT
Start: 2024-01-16 | End: 2024-02-13

## 2023-12-19 NOTE — PROGRESS NOTES
Chief complaint  Back and lower limbs pain    History  Ms Granados is back for a visit  The pain in the back is deep achy worse in the mid back area.  This is associated with tight muscle bands.  This limits the range of motion of the lumbar spine mainly in forward flexion.  The pain in the back is radiating around the side on the lateral aspect of the   thighs going down toward the lateral aspect of the legs into the lateral malleosli toward the lateral aspect of the feet towards the big toes.    This pain down to the lower limbs is more of a burning tingling sensation.  The pain in the   lower limbs worsens with bending forward or with any lifting, it improves with laying on the side and resting.  This is similar to the associated pain in the middle to lower back.  Denied any bowel or bladder incontinence.  With the worst pain there is tendency to catch the toe especially on carpeted area.  This occurs mostly when tired and toward the end of the day.     Meds are helping  She is also dealing with side effect of radiation therapy but that is improving       Pain level without medication is 8/10 , with the medication pain level 4/10.     The pain meds are helping control the pain and improving Activities of Daily living and quality of life and quality of sleep.    opioids treatment agreement 2023  Oarrs pulled and scanned in the chart  no concerns  last urine toxicology testing earlier this year and it was compliant we will repeat  Xray updated spine   ORT Score is  0  Pain pathology and pain generators spine   Modalities tried injection, surgery, physical therapy, TENS unit, nonsteroidal anti-inflammatory medication       Denied any fever or chills. No weight loss and no night sweats. No cough or sputum production. No diarrhea   The constipation has been responding to fibers and over the counter medications.     No bladder and bowel incontinence and no other changes in bladder and bowel. No skin changes.  Reports  tiredness and fatigability only if the pain is not controlled.   Denied opioids diversion and abuse and denies alcoholism. Denies overuse of the pain medications.    The control of the pain with the pain medications is helping the control of the symptoms and allowing the function and activities of daily living, enjoyment of life, improving the quality of life and sleep with less interruption by the pain. The goal is symptomatic control of the nonmalignant chronic pain and not to repair the permanent damage in the tissues inducing the chronic pain conditions. We are aiming to shift the focus from the nonmalignant chronic pain to other aspects of life by symptomatically treating this chronic pain. If this pain is not treated it will lead to major morbidity and it is also associated with increased risks of mortality. The patient understands those very clearly and also understand high risks of morbidity and mortality if not strictly adherent to the treatment recommendations and reporting any associated side effects. Also patient understand the full responsibility associated with these medications to avoid abuse or overuse or any use of these medications for anything besides treating the patient's own chronic pain and nothing else under any circumstances.        Physical examination  Awake, alert and oriented for time place and persons   declined Chaperone for the visit and was adequately  draped for the exam.      There is decreased sensory to light touch on the lateral aspects of the thighs and around the   knee going down to the lateral aspect of the legs to the   lateral malleoli into the dorsum of the feet..    Deep tendon reflexes is present for the patellar tendons bilaterally.  Achilles reflexes are present bilaterally and symmetric.    Medial Hamstrings reflex is decreased bilaterally  Plantar cutaneous are downgoing.  Ankle dorsiflexion is 5/5 bilaterally.  Plantar flexion of the ankles are 5/5 bilaterally.   Big toe extension is 4/5 bilaterally  Negative Tinel's sign over the right peroneal nerve at the fibular neck.  Dulce sign for axial loading and global rotation are negative.  No aberrant pain behavior.     Diagnosis  Problem List Items Addressed This Visit       DJD (degenerative joint disease), lumbar    Herniated nucleus pulposus, L4-5 right - Primary    Herniated nucleus pulposus, L5-S1, right    Relevant Medications    HYDROcodone-acetaminophen (Norco)  mg tablet    HYDROcodone-acetaminophen (Norco)  mg tablet (Start on 1/16/2024)    Long term current use of opiate analgesic    Relevant Orders    Opiate/Opioid/Benzo Prescription Compliance        Plan  Reviewed the pain generators.  Went over the types of pain with neuropathic and nociceptive and different pathologies and therapeutic modalities. Discussed the mechanism of action of interventions from acupuncture, physical therapy , regular exercises, injections, botox, spinal cord stimulation, and role of surgery     Went over pathology of the intervertebral disc displacement and the anatomical relation to the Nerve roots and relation to the radicular symptoms. Went over treatment modalities with conservative treatment including acupuncture   and epidural steroid injection with fluoroscopy guidance and last resort of surgery    Based on the above findings and the clinical response to the opioids medications and improvement of the activities of daily living, sleep, and work performance. We made this complex decision to continue the opioids therapy in light of the evidence of the patient's responsibility in using the pain medications as prescribed for the nonmalignant chronic pain condition. We discussed about the use of the pain medications to treat the symptoms of chronic nonmalignant pain and we are not trying the repair the permanent damage in the tissues, rather we are trying to control the symptoms induced by the permanent damage to the  tissues inducing the chronic pain condition and resulting disability. I explained the difference and discussed it with the patient and stressed the importance of knowing the difference especially because of the potential side effects and the potential addicting effect and habit forming nature of the dangerous drugs we are using to treat the symptoms of the chronic pain.      We discussed that we are prescribing the medications on good miri and legitimate medical reason.     We reviewed the side effects and precautions of opioids prescriptions as discussed in the opioids treatment agreement.    realizes the interaction between the therapeutic classes including the respiratory depression and potential death     Random drug testing twice in 6 months we will submit     Hydrocodone 10 TID  has a narcan at home know how and when to use it if needed.     Discussed about NSAIDS and I explained about the opioids sparing effect to allow keeping the opioids dose at minimal effective dose.   I went over the potential side effects of the NSAIDS on the gastrointestinal, renal and cardiovascular systems.      I detailed the side effects from the acetaminophen in the medication and made aware of those. I also explained about the cumulative effects on the organs and mainly the liver.     Given the opioids therapy , we discussed about the risk for accidental over dose on the pain medications, either for patient or other household. I went over the mechanism of action and mode of use of the Naloxone according to the  recommendations. I will provide a prescription for a kit.     Follow-up 8 weeks or earlier if needed     The level of clinical decision making in this office visit,  is high, given the high risks of complications with the morbidity and mortality due to the fact that acute and chronic pain may pose a threat to life and bodily function, if under treated, poorly treated, or with failure to maintain adequate  treatment and timely medical follow up. Additionally over treatment has its own set of complications including overdosing on the pain medications and also the habit forming potentials with the use of the medications used to treat chronic painful conditions including therapeutic classes classified as dangerous medications. Given the serious and fluctuating nature of pain level and instensity with extensive consideration for whenever pain changes, there is always the risk of prolonged functional impairment requiring close patient monitoring with regular assessments and reassessments and high level medical decision making at every office visit. The amount and complexity of data reviewed is high given the patient clinical presentation, labs,  data, radiology reports, and other tests as discussed during office visits. Pertinent data whether positive or negative were taken in consideration in the process of making this high level medical decision.

## 2023-12-20 ENCOUNTER — OFFICE VISIT (OUTPATIENT)
Dept: PRIMARY CARE | Facility: CLINIC | Age: 83
End: 2023-12-20
Payer: MEDICARE

## 2023-12-20 VITALS
DIASTOLIC BLOOD PRESSURE: 80 MMHG | WEIGHT: 182 LBS | BODY MASS INDEX: 33.49 KG/M2 | HEIGHT: 62 IN | HEART RATE: 69 BPM | SYSTOLIC BLOOD PRESSURE: 168 MMHG

## 2023-12-20 DIAGNOSIS — R60.0 LOCALIZED EDEMA: ICD-10-CM

## 2023-12-20 DIAGNOSIS — I48.91 ATRIAL FIBRILLATION, UNSPECIFIED TYPE (MULTI): ICD-10-CM

## 2023-12-20 DIAGNOSIS — Z00.00 ENCOUNTER FOR MEDICARE ANNUAL WELLNESS EXAM: Primary | ICD-10-CM

## 2023-12-20 DIAGNOSIS — I10 HYPERTENSION, ESSENTIAL: ICD-10-CM

## 2023-12-20 PROBLEM — N39.0 URINARY TRACT INFECTION: Status: RESOLVED | Noted: 2023-03-21 | Resolved: 2023-12-20

## 2023-12-20 PROBLEM — R10.84 GENERALIZED ABDOMINAL PAIN: Status: RESOLVED | Noted: 2023-03-21 | Resolved: 2023-12-20

## 2023-12-20 PROBLEM — L03.90 CELLULITIS: Status: RESOLVED | Noted: 2023-03-21 | Resolved: 2023-12-20

## 2023-12-20 PROBLEM — B37.31 VAGINAL YEAST INFECTION: Status: RESOLVED | Noted: 2023-03-21 | Resolved: 2023-12-20

## 2023-12-20 PROCEDURE — 1036F TOBACCO NON-USER: CPT | Performed by: NURSE PRACTITIONER

## 2023-12-20 PROCEDURE — G0439 PPPS, SUBSEQ VISIT: HCPCS | Performed by: NURSE PRACTITIONER

## 2023-12-20 PROCEDURE — 1170F FXNL STATUS ASSESSED: CPT | Performed by: NURSE PRACTITIONER

## 2023-12-20 PROCEDURE — 99214 OFFICE O/P EST MOD 30 MIN: CPT | Performed by: NURSE PRACTITIONER

## 2023-12-20 PROCEDURE — 3077F SYST BP >= 140 MM HG: CPT | Performed by: NURSE PRACTITIONER

## 2023-12-20 PROCEDURE — 3079F DIAST BP 80-89 MM HG: CPT | Performed by: NURSE PRACTITIONER

## 2023-12-20 PROCEDURE — 1159F MED LIST DOCD IN RCRD: CPT | Performed by: NURSE PRACTITIONER

## 2023-12-20 PROCEDURE — 1125F AMNT PAIN NOTED PAIN PRSNT: CPT | Performed by: NURSE PRACTITIONER

## 2023-12-20 RX ORDER — LOSARTAN POTASSIUM 50 MG/1
50 TABLET ORAL 2 TIMES DAILY
Qty: 180 TABLET | Refills: 3 | Status: SHIPPED | OUTPATIENT
Start: 2023-12-20

## 2023-12-20 ASSESSMENT — ENCOUNTER SYMPTOMS
LOSS OF SENSATION IN FEET: 0
DEPRESSION: 1
OCCASIONAL FEELINGS OF UNSTEADINESS: 1

## 2023-12-20 ASSESSMENT — PATIENT HEALTH QUESTIONNAIRE - PHQ9
10. IF YOU CHECKED OFF ANY PROBLEMS, HOW DIFFICULT HAVE THESE PROBLEMS MADE IT FOR YOU TO DO YOUR WORK, TAKE CARE OF THINGS AT HOME, OR GET ALONG WITH OTHER PEOPLE: NOT DIFFICULT AT ALL
SUM OF ALL RESPONSES TO PHQ9 QUESTIONS 1 AND 2: 1
1. LITTLE INTEREST OR PLEASURE IN DOING THINGS: NOT AT ALL
2. FEELING DOWN, DEPRESSED OR HOPELESS: SEVERAL DAYS

## 2023-12-20 ASSESSMENT — ACTIVITIES OF DAILY LIVING (ADL)
DOING_HOUSEWORK: NEEDS ASSISTANCE
DRESSING: INDEPENDENT
MANAGING_FINANCES: NEEDS ASSISTANCE
BATHING: INDEPENDENT
GROCERY_SHOPPING: NEEDS ASSISTANCE
TAKING_MEDICATION: NEEDS ASSISTANCE

## 2023-12-20 NOTE — PROGRESS NOTES
"Subjective   Reason for Visit: Allyson Granados is an 83 y.o. female here for a Medicare Wellness visit.     HPI    Has not been checking her blood pressure at home because she was always getting different readings. Reviewed her medications, unsure if she has been taking losartan once or twice per day, she believes just once per day.     Reports slight WATTERS. Has an albuterol but does not feel the need to use it. Reports BLE edema that has been worse over the last couple days. Denies any weight changes or worsened shortness of breath than baseline. States she is always about 183 lbs. Denies vision changes, headaches, dizziness, chest pain, palpitations, or edema.      Patient Care Team:  KELLI Maciel as PCP - General (Family Medicine)  [facility]  Jaun England MD as Surgeon (Pain Medicine)  Chung FOWLER MD as Cardiologist (Cardiology)  KELLI Gaspar as Consulting Physician (Radiation Oncology)  Milly Mendoza MD as Surgeon (Pulmonary Disease)  Odsesa Nicole MD as Radiation Oncologist (Radiation Oncology)  Ana Paula Ackerman MD as Consulting Physician (Nephrology)     Review of Systems  ROS negative except as noted above in HPI.       Objective   Vitals:  /80   Pulse 69   Ht 1.575 m (5' 2\")   Wt 82.6 kg (182 lb)   BMI 33.29 kg/m²       Physical Exam  General: Alert and oriented, in no acute distress. Appears stated age, well-nourished, and well hydrated  HEENT:  - Head: Normocephalic and atraumatic   - Eyes: EOMI, PERRLA  - ENT: Hearing grossly intact  Heart: RRR. Systolic murmur. No clicks, or rubs  Lungs: Unlabored breathing. CTAB with no crackles, wheezes, or rhonchi  Abdomen: Normal BS in all 4 quadrants. Soft, non-tender, non-distended, with no masses  Extremities: Warm and well perfused. 2+ BLE pitting edema   Musculoskeletal: Normal gait and station  Neurological: Alert and oriented. No gross neurological " deficits  Psychological: Appropriate mood and affect  Skin: No rash, abnormal lesions, cyanosis, or erythema    Assessment/Plan     Medicare Wellness Exam  - Declined all vaccines  - Declined DEXA scan  - Will complete routine labs at next appt as she is not fasting    Hypertension  - Uncontrolled, does not believe she is taking losartan twice per day, just once per day  - Advised to take losartan 50 mg BID (call if she is already doing this); continue metoprolol 50 mg every day     BLE edema  - Per patient, worsened over the last 2-3 days  - Increase furosemide to 40 mg every day (+potassium to 20 meq every day) for the next 3 days  - Limit Na+ intake, elevate legs, compression hose  - Call if no improvement or worsens    RTC in 1 month for hypertension or sooner KRISTI Montalvo-CNP  [facility]

## 2023-12-20 NOTE — PATIENT INSTRUCTIONS
For the next 3 days, please take TWO furosemide tablets and TWO potassium tablets. Then you can go back to one tablet per day for each. Let me know if this doesn't help.

## 2023-12-22 DIAGNOSIS — I10 ESSENTIAL (PRIMARY) HYPERTENSION: ICD-10-CM

## 2023-12-22 RX ORDER — POTASSIUM CHLORIDE 750 MG/1
TABLET, EXTENDED RELEASE ORAL
Qty: 90 TABLET | Refills: 1 | Status: SHIPPED | OUTPATIENT
Start: 2023-12-22 | End: 2024-05-08 | Stop reason: SDUPTHER

## 2024-01-01 ENCOUNTER — APPOINTMENT (OUTPATIENT)
Dept: RADIATION ONCOLOGY | Facility: CLINIC | Age: 84
End: 2024-01-01
Payer: MEDICARE

## 2024-01-03 ENCOUNTER — LAB (OUTPATIENT)
Dept: LAB | Facility: LAB | Age: 84
End: 2024-01-03
Payer: MEDICARE

## 2024-01-03 DIAGNOSIS — Z79.891 LONG TERM CURRENT USE OF OPIATE ANALGESIC: ICD-10-CM

## 2024-01-03 LAB
AMPHETAMINES UR QL SCN: NORMAL
BARBITURATES UR QL SCN: NORMAL
BZE UR QL SCN: NORMAL
CANNABINOIDS UR QL SCN: NORMAL
CREAT UR-MCNC: 51 MG/DL (ref 20–320)
PCP UR QL SCN: NORMAL

## 2024-01-03 PROCEDURE — 80358 DRUG SCREENING METHADONE: CPT

## 2024-01-03 PROCEDURE — 80368 SEDATIVE HYPNOTICS: CPT

## 2024-01-03 PROCEDURE — 80354 DRUG SCREENING FENTANYL: CPT

## 2024-01-03 PROCEDURE — 80373 DRUG SCREENING TRAMADOL: CPT

## 2024-01-03 PROCEDURE — 80307 DRUG TEST PRSMV CHEM ANLYZR: CPT

## 2024-01-03 PROCEDURE — 80365 DRUG SCREENING OXYCODONE: CPT

## 2024-01-03 PROCEDURE — 80346 BENZODIAZEPINES1-12: CPT

## 2024-01-03 PROCEDURE — 80361 OPIATES 1 OR MORE: CPT

## 2024-01-03 PROCEDURE — 82570 ASSAY OF URINE CREATININE: CPT

## 2024-01-04 ENCOUNTER — TELEPHONE (OUTPATIENT)
Dept: CARDIOLOGY | Facility: CLINIC | Age: 84
End: 2024-01-04
Payer: MEDICARE

## 2024-01-04 NOTE — TELEPHONE ENCOUNTER
Pt called in to [facility] stating Diltiazem refill costs $50, unable to afford this. RN called [facility] in [place] spoke to pharmacy rep, applied coupon from online, cost down to $15.20. Called pt back and notified her of this. Pt can afford med at this cost, appreciative of call.

## 2024-01-08 LAB
1OH-MIDAZOLAM UR CFM-MCNC: <25 NG/ML
6MAM UR CFM-MCNC: <25 NG/ML
7AMINOCLONAZEPAM UR CFM-MCNC: <25 NG/ML
A-OH ALPRAZ UR CFM-MCNC: <25 NG/ML
ALPRAZ UR CFM-MCNC: <25 NG/ML
CHLORDIAZEP UR CFM-MCNC: <25 NG/ML
CLONAZEPAM UR CFM-MCNC: <25 NG/ML
CODEINE UR CFM-MCNC: <50 NG/ML
DIAZEPAM UR CFM-MCNC: <25 NG/ML
EDDP UR CFM-MCNC: <25 NG/ML
FENTANYL UR CFM-MCNC: <2.5 NG/ML
HYDROCODONE CTO UR CFM-MCNC: 1338 NG/ML
HYDROMORPHONE UR CFM-MCNC: 171 NG/ML
LORAZEPAM UR CFM-MCNC: <25 NG/ML
METHADONE UR CFM-MCNC: <25 NG/ML
MIDAZOLAM UR CFM-MCNC: <25 NG/ML
MORPHINE UR CFM-MCNC: <50 NG/ML
NORDIAZEPAM UR CFM-MCNC: <25 NG/ML
NORFENTANYL UR CFM-MCNC: <2.5 NG/ML
NORHYDROCODONE UR CFM-MCNC: 645 NG/ML
NOROXYCODONE UR CFM-MCNC: <25 NG/ML
NORTRAMADOL UR-MCNC: <50 NG/ML
OXAZEPAM UR CFM-MCNC: <25 NG/ML
OXYCODONE UR CFM-MCNC: <25 NG/ML
OXYMORPHONE UR CFM-MCNC: <25 NG/ML
TEMAZEPAM UR CFM-MCNC: <25 NG/ML
TRAMADOL UR CFM-MCNC: <50 NG/ML
ZOLPIDEM UR CFM-MCNC: <25 NG/ML
ZOLPIDEM UR-MCNC: <25 NG/ML

## 2024-01-11 ENCOUNTER — OFFICE VISIT (OUTPATIENT)
Dept: PAIN MEDICINE | Facility: CLINIC | Age: 84
End: 2024-01-11
Payer: MEDICARE

## 2024-01-11 DIAGNOSIS — Z79.891 LONG TERM CURRENT USE OF OPIATE ANALGESIC: ICD-10-CM

## 2024-01-11 DIAGNOSIS — I48.91 ATRIAL FIBRILLATION, UNSPECIFIED TYPE (MULTI): Primary | ICD-10-CM

## 2024-01-11 DIAGNOSIS — M51.27 HERNIATED NUCLEUS PULPOSUS, L5-S1, RIGHT: ICD-10-CM

## 2024-01-11 DIAGNOSIS — M54.16 RIGHT LUMBAR RADICULOPATHY: ICD-10-CM

## 2024-01-11 DIAGNOSIS — M51.26 HERNIATED NUCLEUS PULPOSUS, L4-5 RIGHT: Primary | ICD-10-CM

## 2024-01-11 PROCEDURE — 99214 OFFICE O/P EST MOD 30 MIN: CPT | Performed by: PHYSICAL MEDICINE & REHABILITATION

## 2024-01-11 PROCEDURE — 1036F TOBACCO NON-USER: CPT | Performed by: PHYSICAL MEDICINE & REHABILITATION

## 2024-01-11 PROCEDURE — 1125F AMNT PAIN NOTED PAIN PRSNT: CPT | Performed by: PHYSICAL MEDICINE & REHABILITATION

## 2024-01-11 RX ORDER — HYDROCODONE BITARTRATE AND ACETAMINOPHEN 10; 325 MG/1; MG/1
1 TABLET ORAL 3 TIMES DAILY
Qty: 84 TABLET | Refills: 0 | Status: SHIPPED | OUTPATIENT
Start: 2024-02-13 | End: 2024-03-07 | Stop reason: SDUPTHER

## 2024-01-11 NOTE — PROGRESS NOTES
Chief complaint  Back and right leg    History  Ms Granados is back for visit.   Done with chemo for breast ca. No surgery  Continue with back and r leg pain  The pain in the back is deep achy worse in the mid back area.  This is associated with tight muscle bands.  This limits the range of motion of the lumbar spine mainly in forward flexion.  The pain in the back is radiating around the side on the lateral aspect of the right thigh going down toward the lateral aspect of the right leg into the lateral malleolus toward the lateral aspect of the foot towards the big toe.    This pain down to the right lower limb is more of a burning tingling sensation.  The pain in the right lower limb worsens with bending forward or with any lifting, it improves with laying on the side and resting.  This is similar to the associated pain in the middle to lower back.  Denied any bowel or bladder incontinence.  With the worst pain there is tendency to catch the toe especially on carpeted area.  This occurs mostly when tired and toward the end of the day.    The skin is intact with no breakdown.  No vesicles.    Pain meds helping . Aware of the opioids treatment controversy       Pain level without medication is 8/10 , with the medication pain level 4/10.     The pain meds are helping control the pain and improving Activities of Daily living and quality of life and quality of sleep.    opioids treatment agreement jan 2024  Oarrs pulled and scanned in the chart  no concerns  last urine toxicology testing earlier this year and it was compliant we will repeat  Xray updated L spine   ORT Score is  0  Pain pathology and pain generators spine  Modalities tried injection, surgery, physical therapy, TENS unit, nonsteroidal anti-inflammatory medication fusion      Denied any fever or chills. No weight loss and no night sweats. No cough or sputum production. No diarrhea   The constipation has been responding to fibers and over the counter  medications.     No bladder and bowel incontinence and no other changes in bladder and bowel. No skin changes.  Reports tiredness and fatigability only if the pain is not controlled.   Denied opioids diversion and abuse and denies alcoholism. Denies overuse of the pain medications.    The control of the pain with the pain medications is helping the control of the symptoms and allowing the function and activities of daily living, enjoyment of life, improving the quality of life and sleep with less interruption by the pain. The goal is symptomatic control of the nonmalignant chronic pain and not to repair the permanent damage in the tissues inducing the chronic pain conditions. We are aiming to shift the focus from the nonmalignant chronic pain to other aspects of life by symptomatically treating this chronic pain. If this pain is not treated it will lead to major morbidity and it is also associated with increased risks of mortality. The patient understands those very clearly and also understand high risks of morbidity and mortality if not strictly adherent to the treatment recommendations and reporting any associated side effects. Also patient understand the full responsibility associated with these medications to avoid abuse or overuse or any use of these medications for anything besides treating the patient's own chronic pain and nothing else under any circumstances.        Physical examination  Awake, alert and oriented for time place and persons   declined Chaperone for the visit and was adequately  draped for the exam.  Healed incision over the L spine   Examination of the lumbar spine showed tight muscle bands in the mid and lower back area, this is more pronounced on the right compared to the left.  Additionally, this is inducing mild reversal of the lumbar lordosis with functional scoliosis with right-sided concavity.  This scoliosis corrected with  bending of the lumbar spine.     Straight leg raising  increased the pain in the back and down the lateral aspect of the right knee onto the lateral leg to the lateral malleolus and foot.     There is decreased sensory to light touch on the lateral aspect of the thigh and around the right knee going down to the lateral aspect of the leg to the right lateral malleolus into the dorsum of the foot..    Deep tendon reflexes is present for the patellar tendons bilaterally.  Achilles reflexes are present bilaterally and symmetric.    Medial Hamstrings reflex is decreased on the right compared to the left side.  Plantar cutaneous are downgoing.  Ankle dorsiflexion is 5/5 bilaterally.  Plantar flexion of the ankles are 5/5 bilaterally.  Big toe extension is 4/5 on the right compared to 5/5 on the left side.    Negative Tinel's sign over the right peroneal nerve at the fibular neck.  Dulce sign for axial loading and global rotation are negative.  No aberrant pain behavior.           Diagnosis  Problem List Items Addressed This Visit       Right lumbar radiculopathy    Herniated nucleus pulposus, L4-5 right - Primary    Herniated nucleus pulposus, L5-S1, right    Relevant Medications    HYDROcodone-acetaminophen (Norco)  mg tablet (Start on 2/13/2024)    Long term current use of opiate analgesic        Plan  Reviewed the pain generators.  Went over the types of pain with neuropathic and nociceptive and different pathologies and therapeutic modalities. Discussed the mechanism of action of interventions from acupuncture, physical therapy , regular exercises, injections, botox, spinal cord stimulation, and role of surgery     Went over pathology of the intervertebral disc displacement and the anatomical relation to the Nerve roots and relation to the radicular symptoms. Went over treatment modalities with conservative treatment including acupuncture   and epidural steroid injection with fluoroscopy guidance and last resort of surgery    Based on the above findings and the  clinical response to the opioids medications and improvement of the activities of daily living, sleep, and work performance. We made this complex decision to continue the opioids therapy in light of the evidence of the patient's responsibility in using the pain medications as prescribed for the nonmalignant chronic pain condition. We discussed about the use of the pain medications to treat the symptoms of chronic nonmalignant pain and we are not trying the repair the permanent damage in the tissues, rather we are trying to control the symptoms induced by the permanent damage to the tissues inducing the chronic pain condition and resulting disability. I explained the difference and discussed it with the patient and stressed the importance of knowing the difference especially because of the potential side effects and the potential addicting effect and habit forming nature of the dangerous drugs we are using to treat the symptoms of the chronic pain.      We discussed that we are prescribing the medications on good miri and legitimate medical reason.     We reviewed the side effects and precautions of opioids prescriptions as discussed in the opioids treatment agreement.    realizes the interaction between the therapeutic classes including the respiratory depression and potential death     Random drug testing twice in 6 months we will submit     Hydrocodone 10 TID has a narcan at home know how and when to use it if needed.     Discussed about NSAIDS and I explained about the opioids sparing effect to allow keeping the opioids dose at minimal effective dose.   I went over the potential side effects of the NSAIDS on the gastrointestinal, renal and cardiovascular systems.      I detailed the side effects from the acetaminophen in the medication and made aware of those. I also explained about the cumulative effects on the organs and mainly the liver.     Given the opioids therapy , we discussed about the risk for  accidental over dose on the pain medications, either for patient or other household. I went over the mechanism of action and mode of use of the Naloxone according to the  recommendations. I will provide a prescription for a kit.     Follow-up 8 weeks or earlier if needed     The level of clinical decision making in this office visit,  is high, given the high risks of complications with the morbidity and mortality due to the fact that acute and chronic pain may pose a threat to life and bodily function, if under treated, poorly treated, or with failure to maintain adequate treatment and timely medical follow up. Additionally over treatment has its own set of complications including overdosing on the pain medications and also the habit forming potentials with the use of the medications used to treat chronic painful conditions including therapeutic classes classified as dangerous medications. Given the serious and fluctuating nature of pain level and instensity with extensive consideration for whenever pain changes, there is always the risk of prolonged functional impairment requiring close patient monitoring with regular assessments and reassessments and high level medical decision making at every office visit. The amount and complexity of data reviewed is high given the patient clinical presentation, labs,  data, radiology reports, and other tests as discussed during office visits. Pertinent data whether positive or negative were taken in consideration in the process of making this high level medical decision.

## 2024-01-12 RX ORDER — METOPROLOL SUCCINATE 50 MG/1
50 TABLET, EXTENDED RELEASE ORAL DAILY
Qty: 90 TABLET | Refills: 2 | Status: SHIPPED | OUTPATIENT
Start: 2024-01-12

## 2024-01-12 RX ORDER — AMIODARONE HYDROCHLORIDE 200 MG/1
200 TABLET ORAL
Qty: 90 TABLET | Refills: 2 | Status: SHIPPED | OUTPATIENT
Start: 2024-01-12

## 2024-01-25 ENCOUNTER — OFFICE VISIT (OUTPATIENT)
Dept: SURGICAL ONCOLOGY | Facility: CLINIC | Age: 84
End: 2024-01-25
Payer: MEDICARE

## 2024-01-25 ENCOUNTER — HOSPITAL ENCOUNTER (OUTPATIENT)
Dept: RADIOLOGY | Facility: CLINIC | Age: 84
Discharge: HOME | End: 2024-01-25
Payer: MEDICARE

## 2024-01-25 VITALS
SYSTOLIC BLOOD PRESSURE: 171 MMHG | BODY MASS INDEX: 31.45 KG/M2 | WEIGHT: 171.96 LBS | HEART RATE: 54 BPM | OXYGEN SATURATION: 94 % | RESPIRATION RATE: 16 BRPM | DIASTOLIC BLOOD PRESSURE: 82 MMHG | TEMPERATURE: 97.3 F

## 2024-01-25 VITALS — HEIGHT: 62 IN | WEIGHT: 182 LBS | BODY MASS INDEX: 33.49 KG/M2

## 2024-01-25 DIAGNOSIS — Z90.12 STATUS POST PARTIAL MASTECTOMY OF LEFT BREAST: ICD-10-CM

## 2024-01-25 DIAGNOSIS — Z85.3 PERSONAL HISTORY OF BREAST CANCER: ICD-10-CM

## 2024-01-25 DIAGNOSIS — I48.91 ATRIAL FIBRILLATION, UNSPECIFIED TYPE (MULTI): ICD-10-CM

## 2024-01-25 DIAGNOSIS — Z86.79 HISTORY OF HYPERTENSION: ICD-10-CM

## 2024-01-25 DIAGNOSIS — Z85.3 ENCOUNTER FOR FOLLOW-UP SURVEILLANCE OF BREAST CANCER: ICD-10-CM

## 2024-01-25 DIAGNOSIS — R06.09 DYSPNEA ON EXERTION: ICD-10-CM

## 2024-01-25 DIAGNOSIS — R92.8 OTHER ABNORMAL AND INCONCLUSIVE FINDINGS ON DIAGNOSTIC IMAGING OF BREAST: ICD-10-CM

## 2024-01-25 DIAGNOSIS — C77.3 MALIGNANT NEOPLASM METASTATIC TO LYMPH NODE OF UPPER EXTREMITY (MULTI): Primary | ICD-10-CM

## 2024-01-25 DIAGNOSIS — Z08 ENCOUNTER FOR FOLLOW-UP SURVEILLANCE OF BREAST CANCER: ICD-10-CM

## 2024-01-25 DIAGNOSIS — Z92.3 S/P RADIATION THERAPY: ICD-10-CM

## 2024-01-25 PROCEDURE — 1160F RVW MEDS BY RX/DR IN RCRD: CPT | Performed by: SURGERY

## 2024-01-25 PROCEDURE — 77062 BREAST TOMOSYNTHESIS BI: CPT

## 2024-01-25 PROCEDURE — 99213 OFFICE O/P EST LOW 20 MIN: CPT | Performed by: SURGERY

## 2024-01-25 PROCEDURE — G0279 TOMOSYNTHESIS, MAMMO: HCPCS | Performed by: STUDENT IN AN ORGANIZED HEALTH CARE EDUCATION/TRAINING PROGRAM

## 2024-01-25 PROCEDURE — 1036F TOBACCO NON-USER: CPT | Performed by: SURGERY

## 2024-01-25 PROCEDURE — 3079F DIAST BP 80-89 MM HG: CPT | Performed by: SURGERY

## 2024-01-25 PROCEDURE — 77066 DX MAMMO INCL CAD BI: CPT | Performed by: STUDENT IN AN ORGANIZED HEALTH CARE EDUCATION/TRAINING PROGRAM

## 2024-01-25 PROCEDURE — 1159F MED LIST DOCD IN RCRD: CPT | Performed by: SURGERY

## 2024-01-25 PROCEDURE — 1157F ADVNC CARE PLAN IN RCRD: CPT | Performed by: SURGERY

## 2024-01-25 PROCEDURE — 1126F AMNT PAIN NOTED NONE PRSNT: CPT | Performed by: SURGERY

## 2024-01-25 PROCEDURE — 3077F SYST BP >= 140 MM HG: CPT | Performed by: SURGERY

## 2024-01-25 ASSESSMENT — ENCOUNTER SYMPTOMS
CONSTITUTIONAL NEGATIVE: 1
BACK PAIN: 1
ALLERGIC/IMMUNOLOGIC NEGATIVE: 1
PSYCHIATRIC NEGATIVE: 1
EYE ITCHING: 1
MYALGIAS: 1
ROS SKIN COMMENTS: LUMP IN ARMPIT
ENDOCRINE NEGATIVE: 1
GASTROINTESTINAL NEGATIVE: 1
NECK PAIN: 1
NEUROLOGICAL NEGATIVE: 1
RESPIRATORY NEGATIVE: 1
HEMATOLOGIC/LYMPHATIC NEGATIVE: 1
CARDIOVASCULAR NEGATIVE: 1

## 2024-01-25 ASSESSMENT — PAIN SCALES - GENERAL: PAINLEVEL: 0-NO PAIN

## 2024-01-25 NOTE — PROGRESS NOTES
Subjective   Patient ID: Allyson Granados is a 84 y.o. female presenting for breast cancer surveillance.    HPI  Allyson Granados is a pleasant 84 year old  female s/p left completion axillary dissection, excision left axillary skin nodule on 8/7/23. Final pathology showed 13/23 level I and II axillary lymph nodes positive for metastatic carcinoma, skin nodule also positive.     She previously underwent 2/22/2023 left breast Magseed localized partial mastectomy and left axillary sentinel lymph node biopsy. 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      Breast cancer history:  She self palpated a left breast mass the beginning of January. Past medical history significant for CHF, Afib (on Eliquis), CKD (stage 3), HTN, COPD, hypothyroid, chronic lumbar pain     On 1/13/2023 Bilateral diagnostic mammogram and left breast ultrasound indicates BI-RADS Category 4, left breast 3:00 6 cm from the nipple is an irregular, circumscribed hypoechoic mass measuring 2.1 x 1 x 1.7 cm, biopsy recommended. Scanning of left axilla demonstrates an enlarged left axillary lymph node measuring 1.7 x 0.9 x 1.5 cm, biopsy recommended. 2 additional normal-appearing nodes. No suspicious masses or calcifications are seen in the right breast.      On 1/13/2023 core needle biopsies yielded left breast invasive ductal carcinoma, grade 3, ER <1%, FL 5-10%, HER2 negative. Left axillary lymph node with metastatic carcinoma. [id number], clinical stage IIB.      She was seen in multidisciplinary clinic and chemotherapy was not recommended. Adjuvant radiation was recommended.      On 2/22/2023 Left breast Magseed localized partial mastectomy and left axillary sentinel lymph node biopsy. 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      She recovered well from surgery. She met with Dr. Odessa Nicole and underwent a CT simulation where a suspicious left axillary lymph node was seen.      On 6/13/2023 left axillary ultrasound showed 4 abnormal lymph nodes. Lymph node 1 measures 1.7 x 1.1 x 1.0 cm. Lymph node 2 measures 1.8 x 1.4 x 1.2 cm. Lymph node 3 measures 1.0 x 0.9 x 0.8 cm. Lymph node 4 is deep and measures 1.2 x 0.8 cm, BI- RADS Category 4.      On 6/13/2023 left axillary lymph node #1 ultrasound biopsy yielded minute focus of lymphoid tissue and fibroadipose tissue, negative for carcinoma; discordant. Left axillary lymph node #2 ultrasound core guided biopsy yielded metastatic carcinoma, ER negative, AR negative, HER2 equivocal, not amplified by dual-juan manuel; concordant.     8/7/23 Left completion axillary dissection, excision left axillary skin nodule.  Final pathology showed 13/23 level I and II axillary lymph nodes positive for metastatic carcinoma, skin nodule also positive.       8/24/23 PET CT showed   1. New hypermetabolic left supraclavicular and left level 3 axillarylymph nodes compatible with metastatic troy disease.  2. Hypermetabolic activity in the left axillary region compatible with postsurgical change. A more focally intense area of hypermetabolic activity in the inferior/lateral left axillary region possibly correlating with a nodular soft tissue density, could reflect posttreatment change or an additional axillary troy metastasis.  3. Post surgical changes in the left breast without evidence of hypermetabolic residual disease.    She met with Dr. Bates to discuss adjuvant systemic therapy and declined capecitabine.      She completed whole breast and comprehensive troy radiation with Dr. Nicole 9/12/23-10/16/23 and did well overall.     Today, 1/25/24 bilateral mammogram today BI-RADS Category 2.      She presents today for breast cancer surveillance. She has not noted  any new breast masses or nodules, skin or nipple changes, nipple discharge, or axillary lymphadenopathy bilaterally.       FEMALE HISTORY: menarche age 10, , first birth age 25, she did not breastfeed, no OCPs, menopause age 34 (she underwent total hysterectomy with removal of bilateral ovaries at age 34 for bleeding), HRT x 20 years, scattered fibroglandular tissue     FAMILY CANCER HISTORY:   Sister: Breast cancer age 60, treated with lumpectomy, radiation, and endocrine therapy     SOCIAL HISTORY: No use of tobacco, no alcohol. ,  passed away from lung cancer. Lives with son, who helps with her care. Daughter Magaly is contact for medical visits: [phone].        Review of Systems   Constitutional: Negative.    HENT: Negative.     Eyes:  Positive for itching.        Dry eyes   Respiratory: Negative.     Cardiovascular: Negative.    Gastrointestinal: Negative.    Endocrine: Negative.    Genitourinary: Negative.    Musculoskeletal:  Positive for back pain, myalgias and neck pain.   Skin:         Lump in armpit   Allergic/Immunologic: Negative.    Neurological: Negative.    Hematological: Negative.    Psychiatric/Behavioral: Negative.     All other systems reviewed and are negative.    Objective   Physical Exam  General: Otherwise healthy appearing. No acute distress.      HEENT: Conjunctiva well-colored, sclera non-icteric. Neck supple, trachea midline. No lymphadenopathy or thyromegaly.      Cardiovascular: Regular rate and rhythm.     Respiratory: Clear to auscultation bilaterally.      Breast: Exam performed in the sitting and supine positions. Left breast: 5:00, 5 cm from nipple, well-healed incision with underlying scar tissue. No palpable abnormalities, no skin or nipple changes. Well healed left axillary incision. Right breast: No palpable abnormalities, no skin or nipple changes.      Abdomen: Soft, non-tender, non-distended.     Extremities: Atraumatic. Lymphedema left arm down to  wrist.      Neurologic: Motor and sensory grossly intact. Alert and oriented.      Lymphatic: No axillary, supraclavicular, or infraclavicular lymphadenopathy bilaterally.     Assessment/Plan   Today we reviewed your pertinent history, physical exam, imaging, pathology, and treatment for breast cancer. You have completed surgery, and radiation. Your clinical exam and imaging today show no worrisome findings.     All questions were answered in detail, and we are in agreement with the following plan:   1. Please continue to follow-up with Dr. Bates or Suyapa Hdez CNP in medical oncology.   2. Please follow up in radiation oncology as scheduled.   3. Please return for an office visit and exam with one of our nurse practitioners in breast cancer survivorship July 2024. You will be due for bilateral mammogram January 2025.     If you have any questions, concerns, or changes in your breast self-exam prior to our next visit, please call my office at the number below. Our breast care team looks forward to seeing you again soon.     Brianda Hdez MD   Breast Surgical Oncology Department of Surgery   [facility]   Office: [phone] (option #2)   Fax: [phone]     DISCLAIMER: Speech recognition software was used to create portions of this document. While an attempt at proofreading has been made, minor errors in transcription may be present.    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

## 2024-01-26 ENCOUNTER — OFFICE VISIT (OUTPATIENT)
Dept: PRIMARY CARE | Facility: CLINIC | Age: 84
End: 2024-01-26
Payer: MEDICARE

## 2024-01-26 VITALS
HEART RATE: 62 BPM | WEIGHT: 174.6 LBS | SYSTOLIC BLOOD PRESSURE: 145 MMHG | DIASTOLIC BLOOD PRESSURE: 60 MMHG | BODY MASS INDEX: 32.13 KG/M2 | HEIGHT: 62 IN

## 2024-01-26 DIAGNOSIS — I10 PRIMARY HYPERTENSION: ICD-10-CM

## 2024-01-26 DIAGNOSIS — E78.5 HYPERLIPIDEMIA, UNSPECIFIED HYPERLIPIDEMIA TYPE: Primary | ICD-10-CM

## 2024-01-26 DIAGNOSIS — R73.03 PREDIABETES: ICD-10-CM

## 2024-01-26 DIAGNOSIS — B37.89 CANDIDIASIS OF BREAST: ICD-10-CM

## 2024-01-26 DIAGNOSIS — E03.8 OTHER SPECIFIED HYPOTHYROIDISM: ICD-10-CM

## 2024-01-26 PROBLEM — K21.9 GASTROESOPHAGEAL REFLUX DISEASE: Status: ACTIVE | Noted: 2023-03-09

## 2024-01-26 PROBLEM — N63.0 BREAST LUMP IN LOWER OUTER QUADRANT: Status: ACTIVE | Noted: 2023-01-13

## 2024-01-26 PROBLEM — N18.30 STAGE 3 CHRONIC KIDNEY DISEASE (MULTI): Status: ACTIVE | Noted: 2022-09-23

## 2024-01-26 PROBLEM — I89.0 LYMPHEDEMA OF UPPER EXTREMITY: Status: ACTIVE | Noted: 2024-01-26

## 2024-01-26 PROBLEM — Z86.39 HISTORY OF THYROID DISORDER: Status: ACTIVE | Noted: 2024-01-26

## 2024-01-26 PROBLEM — R59.0 AXILLARY LYMPHADENOPATHY: Status: ACTIVE | Noted: 2023-02-20

## 2024-01-26 PROBLEM — R07.9 CHEST PAIN: Status: RESOLVED | Noted: 2024-01-26 | Resolved: 2024-01-26

## 2024-01-26 PROBLEM — M75.120 COMPLETE TEAR OF ROTATOR CUFF: Status: ACTIVE | Noted: 2023-03-21

## 2024-01-26 PROBLEM — C77.3 MALIGNANT NEOPLASM METASTATIC TO LYMPH NODE OF UPPER EXTREMITY (MULTI): Status: ACTIVE | Noted: 2023-07-25

## 2024-01-26 PROBLEM — R60.0 LOCALIZED EDEMA: Status: ACTIVE | Noted: 2024-01-26

## 2024-01-26 PROBLEM — M67.919 DISORDER OF ROTATOR CUFF: Status: ACTIVE | Noted: 2024-01-26

## 2024-01-26 PROBLEM — Z90.10 S/P PARTIAL MASTECTOMY: Status: ACTIVE | Noted: 2024-01-26

## 2024-01-26 PROBLEM — J45.909 ASTHMA (HHS-HCC): Status: ACTIVE | Noted: 2023-03-09

## 2024-01-26 PROBLEM — E03.9 HYPOTHYROIDISM: Status: ACTIVE | Noted: 2023-03-09

## 2024-01-26 PROBLEM — R07.9 CHEST PAIN: Status: ACTIVE | Noted: 2024-01-26

## 2024-01-26 PROBLEM — Z85.3 PERSONAL HISTORY OF BREAST CANCER: Status: ACTIVE | Noted: 2023-03-09

## 2024-01-26 PROBLEM — Z86.79 HISTORY OF HYPERTENSION: Status: ACTIVE | Noted: 2024-01-26

## 2024-01-26 PROBLEM — C50.919 MALIGNANT NEOPLASM OF BREAST (MULTI): Status: ACTIVE | Noted: 2023-02-14

## 2024-01-26 PROBLEM — I50.33 ACUTE ON CHRONIC DIASTOLIC CONGESTIVE HEART FAILURE (MULTI): Status: ACTIVE | Noted: 2024-01-26

## 2024-01-26 PROCEDURE — 3078F DIAST BP <80 MM HG: CPT | Performed by: NURSE PRACTITIONER

## 2024-01-26 PROCEDURE — 1157F ADVNC CARE PLAN IN RCRD: CPT | Performed by: NURSE PRACTITIONER

## 2024-01-26 PROCEDURE — 83036 HEMOGLOBIN GLYCOSYLATED A1C: CPT

## 2024-01-26 PROCEDURE — 1160F RVW MEDS BY RX/DR IN RCRD: CPT | Performed by: NURSE PRACTITIONER

## 2024-01-26 PROCEDURE — 1036F TOBACCO NON-USER: CPT | Performed by: NURSE PRACTITIONER

## 2024-01-26 PROCEDURE — 1126F AMNT PAIN NOTED NONE PRSNT: CPT | Performed by: NURSE PRACTITIONER

## 2024-01-26 PROCEDURE — 80053 COMPREHEN METABOLIC PANEL: CPT

## 2024-01-26 PROCEDURE — 80061 LIPID PANEL: CPT

## 2024-01-26 PROCEDURE — 3077F SYST BP >= 140 MM HG: CPT | Performed by: NURSE PRACTITIONER

## 2024-01-26 PROCEDURE — 99213 OFFICE O/P EST LOW 20 MIN: CPT | Performed by: NURSE PRACTITIONER

## 2024-01-26 PROCEDURE — 84443 ASSAY THYROID STIM HORMONE: CPT

## 2024-01-26 PROCEDURE — 36415 COLL VENOUS BLD VENIPUNCTURE: CPT

## 2024-01-26 PROCEDURE — 1159F MED LIST DOCD IN RCRD: CPT | Performed by: NURSE PRACTITIONER

## 2024-01-26 RX ORDER — NYSTATIN 100000 [USP'U]/G
POWDER TOPICAL AS NEEDED
Qty: 60 G | Refills: 0 | Status: SHIPPED | OUTPATIENT
Start: 2024-01-26 | End: 2024-04-12

## 2024-01-26 RX ORDER — PRAVASTATIN SODIUM 40 MG/1
40 TABLET ORAL
COMMUNITY
Start: 2020-05-13 | End: 2024-04-02 | Stop reason: SDUPTHER

## 2024-01-26 RX ORDER — AMLODIPINE BESYLATE 5 MG/1
TABLET ORAL
COMMUNITY
End: 2024-01-26 | Stop reason: WASHOUT

## 2024-01-26 RX ORDER — PHENAZOPYRIDINE HYDROCHLORIDE 100 MG/1
TABLET, FILM COATED ORAL
COMMUNITY
Start: 2013-06-08 | End: 2024-01-26 | Stop reason: WASHOUT

## 2024-01-26 RX ORDER — PREDNISONE 20 MG/1
TABLET ORAL
COMMUNITY
Start: 2023-02-23 | End: 2024-01-26 | Stop reason: WASHOUT

## 2024-01-26 RX ORDER — ALBUTEROL SULFATE 90 UG/1
2 AEROSOL, METERED RESPIRATORY (INHALATION) EVERY 4 HOURS PRN
COMMUNITY
Start: 2020-02-11

## 2024-01-26 RX ORDER — OMEPRAZOLE 40 MG/1
1 CAPSULE, DELAYED RELEASE ORAL DAILY
COMMUNITY
Start: 2020-05-13 | End: 2024-01-26 | Stop reason: WASHOUT

## 2024-01-26 ASSESSMENT — ENCOUNTER SYMPTOMS
OCCASIONAL FEELINGS OF UNSTEADINESS: 0
LOSS OF SENSATION IN FEET: 0
DEPRESSION: 0

## 2024-01-26 NOTE — PROGRESS NOTES
I was present with the APRN student who participated in the documentation of this note. I have personally seen and re-examined the patient and performed the medical decision-making components (assessment and plan of care). I have reviewed the APRN student documentation and verified the findings in the note as written with additions or exceptions as stated in the body of this note.    Gini Orantes, APRN-CNP

## 2024-01-26 NOTE — PROGRESS NOTES
"Subjective   Patient ID: Allyson Granados is a 84 y.o. female who presents for hypertension.    HPI     Reports she has increased losartan to BID, but when she got home she looked at her furosemide bottle and it was 40 mg so she has continued the 40 mg every day along with potassium 10 mEq every day. BLE edema has improved since last visit.    Current meds: losartan 50 mg BID, metoprolol 50 mg every day, furosemide 40 mg every day  Home blood pressure monitoring: not really  High: unknown  Low: unknown  Average: unknown  Salt intake: okay  Caffeine intake: 1 can of soda per day  Diet: eats everything  Exercise: tries to walk, but has a bad back, uses cane  Alcohol use: none  Tobacco use: none  Illicit drug use: none    Denies vision changes, headaches, dizziness, chest pain, palpitations, or edema.     Review of Systems  ROS negative except as noted above in HPI.      Objective   /60   Pulse 62   Ht 1.575 m (5' 2\")   Wt 79.2 kg (174 lb 9.6 oz)   BMI 31.93 kg/m²     Physical Exam  General: Alert and oriented, in no acute distress. Appears stated age, well-nourished, and well hydrated  HEENT:  - Head: Normocephalic and atraumatic   - Eyes: EOMI, PERRLA  - ENT: Hearing grossly intact  Heart: RRR. No murmurs, clicks, or rubs  Lungs: Unlabored breathing. CTAB with no crackles, wheezes, or rhonchi  Abdomen: Normal BS in all 4 quadrants. Soft, non-tender, non-distended, with no masses  Extremities: Warm and well perfused. No edema. Normal peripheral pulses  Musculoskeletal: Normal gait and station  Neurological: Alert and oriented. No gross neurological deficits  Psychological: Appropriate mood and affect  Skin: +Rash under breast, no abnormal lesions, cyanosis, or erythema     Assessment/Plan   1. Hypertension  - Improved, continue losartan 50 mg BID and metoprolol 50 mg every day.  - Labs: CMP     2. Hyperlipidemia  - Labs: Lipid panel    3. BLE edema  - Improved from last visit  - Continue furosemide 40 mg every " day (+potassium 20 mEq every day)  - Limit Na+ intake, elevate legs, compression hose    4. Hypothyroidism  - Labs: TSH    5. Prediabetes  - Labs: Hemoglobin A1c    6. Candidiasis of breast  - Apply nystatin 100,000 unit/gram powder topically under breast(s) if needed for rash or itching.    RTC in 3 months or sooner prn    Fiona Olguin NP student  [facility]

## 2024-01-27 LAB
ALBUMIN SERPL BCP-MCNC: 3.9 G/DL (ref 3.4–5)
ALP SERPL-CCNC: 42 U/L (ref 33–136)
ALT SERPL W P-5'-P-CCNC: 14 U/L (ref 7–45)
ANION GAP SERPL CALC-SCNC: 14 MMOL/L (ref 10–20)
AST SERPL W P-5'-P-CCNC: 15 U/L (ref 9–39)
BILIRUB SERPL-MCNC: 0.5 MG/DL (ref 0–1.2)
BUN SERPL-MCNC: 16 MG/DL (ref 6–23)
CALCIUM SERPL-MCNC: 8.8 MG/DL (ref 8.6–10.6)
CHLORIDE SERPL-SCNC: 104 MMOL/L (ref 98–107)
CHOLEST SERPL-MCNC: 205 MG/DL (ref 0–199)
CHOLESTEROL/HDL RATIO: 3.2
CO2 SERPL-SCNC: 30 MMOL/L (ref 21–32)
CREAT SERPL-MCNC: 1.15 MG/DL (ref 0.5–1.05)
EGFRCR SERPLBLD CKD-EPI 2021: 47 ML/MIN/1.73M*2
EST. AVERAGE GLUCOSE BLD GHB EST-MCNC: 103 MG/DL
GLUCOSE SERPL-MCNC: 99 MG/DL (ref 74–99)
HBA1C MFR BLD: 5.2 %
HDLC SERPL-MCNC: 63.1 MG/DL
LDLC SERPL CALC-MCNC: 126 MG/DL
NON HDL CHOLESTEROL: 142 MG/DL (ref 0–149)
POTASSIUM SERPL-SCNC: 3.2 MMOL/L (ref 3.5–5.3)
PROT SERPL-MCNC: 6.5 G/DL (ref 6.4–8.2)
SODIUM SERPL-SCNC: 145 MMOL/L (ref 136–145)
TRIGL SERPL-MCNC: 82 MG/DL (ref 0–149)
TSH SERPL-ACNC: 3.41 MIU/L (ref 0.44–3.98)
VLDL: 16 MG/DL (ref 0–40)

## 2024-01-30 DIAGNOSIS — E87.6 HYPOKALEMIA: Primary | ICD-10-CM

## 2024-02-01 ENCOUNTER — CLINICAL SUPPORT (OUTPATIENT)
Dept: CARDIOLOGY | Facility: CLINIC | Age: 84
End: 2024-02-01
Payer: MEDICARE

## 2024-02-01 DIAGNOSIS — R06.09 DYSPNEA ON EXERTION: ICD-10-CM

## 2024-02-01 DIAGNOSIS — I50.33 ACUTE ON CHRONIC DIASTOLIC HEART FAILURE (MULTI): ICD-10-CM

## 2024-02-01 PROCEDURE — 93306 TTE W/DOPPLER COMPLETE: CPT | Performed by: STUDENT IN AN ORGANIZED HEALTH CARE EDUCATION/TRAINING PROGRAM

## 2024-02-01 PROCEDURE — 93306 TTE W/DOPPLER COMPLETE: CPT

## 2024-02-02 LAB
AORTIC VALVE MEAN GRADIENT: 8.1 MMHG
AORTIC VALVE PEAK VELOCITY: 1.88 M/S
AV PEAK GRADIENT: 14.1 MMHG
AVA (PEAK VEL): 1.37 CM2
AVA (VTI): 1.23 CM2
EJECTION FRACTION APICAL 4 CHAMBER: 72.4
EJECTION FRACTION: 67 %
LEFT ATRIUM VOLUME AREA LENGTH INDEX BSA: 24.1 ML/M2
LEFT VENTRICLE INTERNAL DIMENSION DIASTOLE: 4.75 CM (ref 3.5–6)
LEFT VENTRICULAR OUTFLOW TRACT DIAMETER: 1.89 CM
MITRAL VALVE E/A RATIO: 0.6
MITRAL VALVE E/E' RATIO: 11.01
RIGHT VENTRICLE FREE WALL PEAK S': 12 CM/S
RIGHT VENTRICLE PEAK SYSTOLIC PRESSURE: 22.3 MMHG
TRICUSPID ANNULAR PLANE SYSTOLIC EXCURSION: 2.6 CM

## 2024-02-13 PROBLEM — Z92.3 S/P RADIATION THERAPY: Status: ACTIVE | Noted: 2024-02-13

## 2024-02-13 PROBLEM — Z85.3 ENCOUNTER FOR FOLLOW-UP SURVEILLANCE OF BREAST CANCER: Status: ACTIVE | Noted: 2024-02-13

## 2024-02-13 PROBLEM — Z08 ENCOUNTER FOR FOLLOW-UP SURVEILLANCE OF BREAST CANCER: Status: ACTIVE | Noted: 2024-02-13

## 2024-02-15 ENCOUNTER — OFFICE VISIT (OUTPATIENT)
Dept: CARDIOLOGY | Facility: CLINIC | Age: 84
End: 2024-02-15
Payer: MEDICARE

## 2024-02-15 VITALS
BODY MASS INDEX: 32.02 KG/M2 | DIASTOLIC BLOOD PRESSURE: 70 MMHG | WEIGHT: 174 LBS | OXYGEN SATURATION: 92 % | SYSTOLIC BLOOD PRESSURE: 150 MMHG | HEART RATE: 65 BPM | HEIGHT: 62 IN

## 2024-02-15 DIAGNOSIS — I50.32 CHRONIC DIASTOLIC CONGESTIVE HEART FAILURE (MULTI): Primary | ICD-10-CM

## 2024-02-15 DIAGNOSIS — I48.0 PAROXYSMAL ATRIAL FIBRILLATION (MULTI): ICD-10-CM

## 2024-02-15 DIAGNOSIS — R06.09 DYSPNEA ON EXERTION: ICD-10-CM

## 2024-02-15 DIAGNOSIS — E78.00 PURE HYPERCHOLESTEROLEMIA: ICD-10-CM

## 2024-02-15 DIAGNOSIS — I10 PRIMARY HYPERTENSION: ICD-10-CM

## 2024-02-15 PROCEDURE — 3077F SYST BP >= 140 MM HG: CPT | Performed by: STUDENT IN AN ORGANIZED HEALTH CARE EDUCATION/TRAINING PROGRAM

## 2024-02-15 PROCEDURE — 1159F MED LIST DOCD IN RCRD: CPT | Performed by: STUDENT IN AN ORGANIZED HEALTH CARE EDUCATION/TRAINING PROGRAM

## 2024-02-15 PROCEDURE — 1126F AMNT PAIN NOTED NONE PRSNT: CPT | Performed by: STUDENT IN AN ORGANIZED HEALTH CARE EDUCATION/TRAINING PROGRAM

## 2024-02-15 PROCEDURE — 1157F ADVNC CARE PLAN IN RCRD: CPT | Performed by: STUDENT IN AN ORGANIZED HEALTH CARE EDUCATION/TRAINING PROGRAM

## 2024-02-15 PROCEDURE — 1160F RVW MEDS BY RX/DR IN RCRD: CPT | Performed by: STUDENT IN AN ORGANIZED HEALTH CARE EDUCATION/TRAINING PROGRAM

## 2024-02-15 PROCEDURE — 1036F TOBACCO NON-USER: CPT | Performed by: STUDENT IN AN ORGANIZED HEALTH CARE EDUCATION/TRAINING PROGRAM

## 2024-02-15 PROCEDURE — 99214 OFFICE O/P EST MOD 30 MIN: CPT | Performed by: STUDENT IN AN ORGANIZED HEALTH CARE EDUCATION/TRAINING PROGRAM

## 2024-02-15 PROCEDURE — 3078F DIAST BP <80 MM HG: CPT | Performed by: STUDENT IN AN ORGANIZED HEALTH CARE EDUCATION/TRAINING PROGRAM

## 2024-02-15 ASSESSMENT — ENCOUNTER SYMPTOMS
OCCASIONAL FEELINGS OF UNSTEADINESS: 1
LOSS OF SENSATION IN FEET: 0
DEPRESSION: 0

## 2024-02-15 ASSESSMENT — PAIN SCALES - GENERAL: PAINLEVEL: 0-NO PAIN

## 2024-02-15 NOTE — PATIENT INSTRUCTIONS
Please continue your current cardiac medications including diltiazem extended release 240 mg daily, Eliquis 5 mg twice daily, losartan 50 mg twice daily, furosemide 20 mg daily, metoprolol succinate 50 mg once daily.     Please followup with me in Cardiology clinic within the next 9 months.  Please return to clinic sooner or seek emergent care if your symptoms reoccur or worsen.

## 2024-02-15 NOTE — PROGRESS NOTES
Follow-up     HPI:    Allyson Granados is a 84 y.o. 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     She is doing relatively well.  She has completed treatment for breast cancer and is relatively stable.  We discussed her recent echocardiogram results.  Blood pressure is a bit elevated in clinic today but is normally controlled at home.  No exacerbating or relieving factors.  Patient denies chest pain and angina.  Pt denies orthopnea, and paroxysmal nocturnal dyspnea.  Pt denies worsening lower extremity edema.  Pt denies palpitations or syncope.  No recent falls.  No fever or chills.  No cough.  No change in bowel or bladder habits.  No sick contacts.  No recent travel.    12 point review of systems including (Constitutional, Eyes, ENMT, Respiratory, Cardiac, Gastrointestinal, Neurological, Psychiatric, and Hematologic) was performed and is otherwise negative.    Past medical history reviewed:   has a past medical history of Breast cancer (CMS/HCC), Personal history of irradiation, Personal history of other diseases of the circulatory system, Personal history of other diseases of the musculoskeletal system and connective tissue, Personal history of other diseases of urinary system, Personal history of other endocrine, nutritional and  metabolic disease, Unspecified asthma, uncomplicated (12/21/2013), and Unspecified dementia, unspecified severity, without behavioral disturbance, psychotic disturbance, mood disturbance, and anxiety (CMS/[facility]) (12/02/2020).    Past surgical history reviewed:   has a past surgical history that includes Breast lumpectomy (Left, 01/19/2023) and Hysterectomy.    Social history reviewed:   reports that she has never smoked. She has never used smokeless tobacco. She reports that she does not drink alcohol and does not use drugs.     Family history reviewed:    Family History   Problem Relation Name Age of Onset    Breast cancer Sister  60       Allergies reviewed: Codeine, Diphenhydramine, Diphenhydramine hcl, and Morphine     Medications reviewed:   Current Outpatient Medications   Medication Instructions    albuterol 90 mcg/actuation inhaler 2 puffs, inhalation, Every 4 hours PRN    amiodarone (PACERONE) 200 mg, oral, Every Day    apixaban (ELIQUIS) 5 mg, oral, 2 times daily    diclofenac sodium 1 % kit Apply 2 inches to each knee and rub until gone. three times a day    furosemide (Lasix) 20 mg tablet 1 tablet, oral, Daily    HYDROcodone-acetaminophen (Norco)  mg tablet 1 tablet, oral, 3 times daily    levothyroxine (Synthroid, Levoxyl) 50 mcg tablet 1 tablet, oral, Daily    losartan (COZAAR) 50 mg, oral, 2 times daily    metoprolol succinate XL (TOPROL-XL) 50 mg, oral, Daily    naloxone (Narcan) 4 mg/0.1 mL nasal spray nasal, Spray one bottle into nostril while patient lay on their back, repeat if needed    nystatin (Mycostatin) 100,000 unit/gram powder Topical, As needed    potassium chloride CR 10 mEq ER tablet TAKE 1 TABLET BY MOUTH EVERY DAY    pravastatin (PRAVACHOL) 40 mg, oral, Daily RT    rOPINIRole (Requip) 4 mg tablet 1 tablet, oral, 2 times daily        Vitals reviewed: Visit Vitals  /70 (Patient Position: Sitting)   Pulse 65       Physical Exam:   General:  Patient is awake, alert, and  oriented.  Patient is in no acute distress.  HEENT:  Pupils equal and reactive.  Normocephalic.  Moist mucosa.    Neck:  No thyromegaly.  Normal Jugular Venous Pressure.  Cardiovascular:  Regular rate and rhythm.  Normal S1 and S2.  2/6 MALIK.  Pulmonary:  Clear to auscultation bilaterally.  Abdomen:  Soft. Non-tender.   Non-distended.  Positive bowel sounds.  Lower Extremities:  2+ pedal pulses. No LE edema.  Neurologic:  Cranial nerves intact.  No focal deficit.   Skin: Skin warm and dry, normal skin turgor.   Psychiatric: Normal affect.    Last Labs:  CBC -      Lab Results   Component Value Date    WBC 8.2 07/19/2023    HGB 14.3 07/19/2023    HCT 46.1 (H) 07/19/2023     07/19/2023        CMP-  Lab Results   Component Value Date    GLUCOSE 99 01/26/2024     01/26/2024    K 3.2 (L) 01/26/2024     01/26/2024    CO2 30 01/26/2024    ANIONGAP 14 01/26/2024    BUN 16 01/26/2024    CREATININE 1.15 (H) 01/26/2024    EGFR 47 (L) 01/26/2024    CALCIUM 8.8 01/26/2024    PHOS CANCELED 05/01/2023    PROT 6.5 01/26/2024    ALBUMIN 3.9 01/26/2024    AST 15 01/26/2024    ALT 14 01/26/2024    ALKPHOS 42 01/26/2024    BILITOT 0.5 01/26/2024        LIPIDS-  Lab Results   Component Value Date    CHOL 205 (H) 01/26/2024    TRIG 82 01/26/2024    HDL 63.1 01/26/2024    CHHDL 3.2 01/26/2024    VLDL 16 01/26/2024        OTHERS-  Lab Results   Component Value Date    HGBA1C 5.2 01/26/2024     (H) 10/29/2021        I personally reviewed the patient's recent vitals, labs, medications, orders, EKGs, pertinent cardiac imaging/ echocardiography and ischemic evaluations including stress testing/ cardiac catheterization.    Assessment and Plan:    Allyson Granados is a 84 y.o. 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 She is doing relatively well.  She has completed treatment for breast cancer and is relatively stable.  We discussed her recent echocardiogram results.  Blood pressure is a bit elevated in clinic today but is normally controlled at home.      Please continue your current cardiac medications including diltiazem extended release 240 mg daily, Eliquis 5 mg twice daily, losartan 50 mg twice daily, furosemide 20 mg daily, metoprolol succinate 50 mg once daily.     Please followup with me in Cardiology clinic within the next 9 months.  Please return to clinic sooner or seek emergent care if your symptoms reoccur or worsen.    Thank you for allowing me to participate in their care.  Please feel free to call me with any further questions or concerns.        Chung Mitchell MD, FACC, KYLE YAN  Division of Cardiovascular Medicine  Medical Director, [facility] [place]  Clinical , [facility] [employer]  [email or web address]   Office:  [phone]

## 2024-02-19 DIAGNOSIS — E03.9 HYPOTHYROIDISM, UNSPECIFIED TYPE: Primary | ICD-10-CM

## 2024-02-19 RX ORDER — LEVOTHYROXINE SODIUM 50 UG/1
50 TABLET ORAL DAILY
Qty: 90 TABLET | Refills: 1 | Status: SHIPPED | OUTPATIENT
Start: 2024-02-19

## 2024-02-27 ENCOUNTER — TELEPHONE (OUTPATIENT)
Dept: PRIMARY CARE | Facility: CLINIC | Age: 84
End: 2024-02-27
Payer: MEDICARE

## 2024-02-27 DIAGNOSIS — G25.81 RESTLESS LEGS SYNDROME (RLS): Primary | ICD-10-CM

## 2024-02-27 RX ORDER — ROPINIROLE 4 MG/1
4 TABLET, FILM COATED ORAL 2 TIMES DAILY
Qty: 180 TABLET | Refills: 3 | Status: SHIPPED | OUTPATIENT
Start: 2024-02-27

## 2024-03-07 ENCOUNTER — OFFICE VISIT (OUTPATIENT)
Dept: PAIN MEDICINE | Facility: CLINIC | Age: 84
End: 2024-03-07
Payer: MEDICARE

## 2024-03-07 DIAGNOSIS — M96.1 FAILED BACK SYNDROME OF LUMBAR SPINE: Primary | ICD-10-CM

## 2024-03-07 DIAGNOSIS — M51.27 HERNIATED NUCLEUS PULPOSUS, L5-S1, RIGHT: ICD-10-CM

## 2024-03-07 DIAGNOSIS — M51.26 HERNIATED NUCLEUS PULPOSUS, L4-5 RIGHT: ICD-10-CM

## 2024-03-07 PROCEDURE — 1126F AMNT PAIN NOTED NONE PRSNT: CPT | Performed by: PHYSICAL MEDICINE & REHABILITATION

## 2024-03-07 PROCEDURE — 99214 OFFICE O/P EST MOD 30 MIN: CPT | Performed by: PHYSICAL MEDICINE & REHABILITATION

## 2024-03-07 PROCEDURE — 1159F MED LIST DOCD IN RCRD: CPT | Performed by: PHYSICAL MEDICINE & REHABILITATION

## 2024-03-07 PROCEDURE — 1157F ADVNC CARE PLAN IN RCRD: CPT | Performed by: PHYSICAL MEDICINE & REHABILITATION

## 2024-03-07 PROCEDURE — 1160F RVW MEDS BY RX/DR IN RCRD: CPT | Performed by: PHYSICAL MEDICINE & REHABILITATION

## 2024-03-07 PROCEDURE — 1036F TOBACCO NON-USER: CPT | Performed by: PHYSICAL MEDICINE & REHABILITATION

## 2024-03-07 RX ORDER — HYDROCODONE BITARTRATE AND ACETAMINOPHEN 10; 325 MG/1; MG/1
1 TABLET ORAL 3 TIMES DAILY
Qty: 84 TABLET | Refills: 0 | Status: SHIPPED | OUTPATIENT
Start: 2024-04-09 | End: 2024-05-02 | Stop reason: SDUPTHER

## 2024-03-07 RX ORDER — HYDROCODONE BITARTRATE AND ACETAMINOPHEN 10; 325 MG/1; MG/1
1 TABLET ORAL 3 TIMES DAILY
Qty: 84 TABLET | Refills: 0 | Status: SHIPPED | OUTPATIENT
Start: 2024-03-12 | End: 2024-04-09

## 2024-03-07 NOTE — PROGRESS NOTES
Chief complaint  Back and Right leg pain    History  Ms Granados is back for visit  Continues with pain in the bacvk andR leg  Getting therapy for recurrent breast ca. That is responding   Main pain in the back  The pain in the back is deep achy worse in the mid back area.  This is associated with tight muscle bands.  This limits the range of motion of the lumbar spine mainly in forward flexion.  The pain in the back is radiating around the side on the lateral aspect of the right thigh going down toward the lateral aspect of the right leg into the lateral malleolus toward the lateral aspect of the foot towards the big toe.    This pain down to the right lower limb is more of a burning tingling sensation.  The pain in the right lower limb worsens with bending forward or with any lifting, it improves with laying on the side and resting.  This is similar to the associated pain in the middle to lower back.  Denied any bowel or bladder incontinence.  With the worst pain there is tendency to catch the toe especially on carpeted area.  This occurs mostly when tired and toward the end of the day.    The skin is intact with no breakdown.  No vesicles.        Pain level without medication is 7/10 , with the medication pain level 2/10.     The pain meds are helping control the pain and improving Activities of Daily living and quality of life and quality of sleep.    opioids treatment agreement Jan 2024  PDI (Pain Disability Index) score: 40  Oarrs pulled and scanned in the chart  no concerns  last urine toxicology testing earlier this year and it was compliant we will repeat  Xray updated spine   ORT Score is  0  Pain pathology and pain generators spine   Modalities tried injection, surgery, physical therapy, TENS unit, nonsteroidal anti-inflammatory medication  did not want SCS      Denied any fever or chills. No weight loss and no night sweats. No cough or sputum production. No diarrhea   The constipation has been responding  to fibers and over the counter medications.     No bladder and bowel incontinence and no other changes in bladder and bowel. No skin changes.  Reports tiredness and fatigability only if the pain is not controlled.   Denied opioids diversion and abuse and denies alcoholism. Denies overuse of the pain medications.    The control of the pain with the pain medications is helping the control of the symptoms and allowing the function and activities of daily living, enjoyment of life, improving the quality of life and sleep with less interruption by the pain. The goal is symptomatic control of the nonmalignant chronic pain and not to repair the permanent damage in the tissues inducing the chronic pain conditions. We are aiming to shift the focus from the nonmalignant chronic pain to other aspects of life by symptomatically treating this chronic pain. If this pain is not treated it will lead to major morbidity and it is also associated with increased risks of mortality. The patient understands those very clearly and also understand high risks of morbidity and mortality if not strictly adherent to the treatment recommendations and reporting any associated side effects. Also patient understand the full responsibility associated with these medications to avoid abuse or overuse or any use of these medications for anything besides treating the patient's own chronic pain and nothing else under any circumstances.        Physical examination  Awake, alert and oriented for time place and persons   declined Chaperone for the visit and was adequately  draped for the exam.    Examination of the lumbar spine showed tight muscle bands in the mid and lower back area, this is more pronounced on the right compared to the left.  Additionally, this is inducing mild reversal of the lumbar lordosis with functional scoliosis with right-sided concavity.  This scoliosis corrected with  bending of the lumbar spine.     Straight leg raising  increased the pain in the back and down the lateral aspect of the right knee onto the lateral leg to the lateral malleolus and foot.     There is decreased sensory to light touch on the lateral aspect of the thigh and around the right knee going down to the lateral aspect of the leg to the right lateral malleolus into the dorsum of the foot..    Deep tendon reflexes is present for the patellar tendons bilaterally.  Achilles reflexes are present bilaterally and symmetric.    Medial Hamstrings reflex is decreased on the right compared to the left side.  Plantar cutaneous are downgoing.  Ankle dorsiflexion is 5/5 bilaterally.  Plantar flexion of the ankles are 5/5 bilaterally.  Big toe extension is 4/5 on the right compared to 5/5 on the left side.    Negative Tinel's sign over the right peroneal nerve at the fibular neck.  Dulce sign for axial loading and global rotation are negative.  No aberrant pain behavior.           Diagnosis  Problem List Items Addressed This Visit       Failed back syndrome of lumbar spine - Primary    Herniated nucleus pulposus, L4-5 right    Herniated nucleus pulposus, L5-S1, right    Relevant Medications    HYDROcodone-acetaminophen (Norco)  mg tablet (Start on 3/12/2024)    HYDROcodone-acetaminophen (Norco)  mg tablet (Start on 4/9/2024)        Plan  Reviewed the pain generators.  Went over the types of pain with neuropathic and nociceptive and different pathologies and therapeutic modalities. Discussed the mechanism of action of interventions from acupuncture, physical therapy , regular exercises, injections, botox, spinal cord stimulation, and role of surgery     Went over pathology of the intervertebral disc displacement and the anatomical relation to the Nerve roots and relation to the radicular symptoms. Went over treatment modalities with conservative treatment including acupuncture   and epidural steroid injection with fluoroscopy guidance and last resort of  surgery    Based on the above findings and the clinical response to the opioids medications and improvement of the activities of daily living, sleep, and work performance. We made this complex decision to continue the opioids therapy in light of the evidence of the patient's responsibility in using the pain medications as prescribed for the nonmalignant chronic pain condition. We discussed about the use of the pain medications to treat the symptoms of chronic nonmalignant pain and we are not trying the repair the permanent damage in the tissues, rather we are trying to control the symptoms induced by the permanent damage to the tissues inducing the chronic pain condition and resulting disability. I explained the difference and discussed it with the patient and stressed the importance of knowing the difference especially because of the potential side effects and the potential addicting effect and habit forming nature of the dangerous drugs we are using to treat the symptoms of the chronic pain.      We discussed that we are prescribing the medications on good miri and legitimate medical reason.     We reviewed the side effects and precautions of opioids prescriptions as discussed in the opioids treatment agreement.    realizes the interaction between the therapeutic classes including the respiratory depression and potential death     Random drug testing twice in 6 months we will submit     Hycrocodone 10 tid  has a narcan at home know how and when to use it if needed.  Discussed about considering cut back on pain medications  Consider cutting back on the pain meds     Discussed about NSAIDS and I explained about the opioids sparing effect to allow keeping the opioids dose at minimal effective dose.   I went over the potential side effects of the NSAIDS on the gastrointestinal, renal and cardiovascular systems.      I detailed the side effects from the acetaminophen in the medication and made aware of those. I also  explained about the cumulative effects on the organs and mainly the liver.     Given the opioids therapy , we discussed about the risk for accidental over dose on the pain medications, either for patient or other household. I went over the mechanism of action and mode of use of the Naloxone according to the  recommendations. I will provide a prescription for a kit.     Follow-up 8 weeks or earlier if needed     The level of clinical decision making in this office visit,  is high, given the high risks of complications with the morbidity and mortality due to the fact that acute and chronic pain may pose a threat to life and bodily function, if under treated, poorly treated, or with failure to maintain adequate treatment and timely medical follow up. Additionally over treatment has its own set of complications including overdosing on the pain medications and also the habit forming potentials with the use of the medications used to treat chronic painful conditions including therapeutic classes classified as dangerous medications. Given the serious and fluctuating nature of pain level and instensity with extensive consideration for whenever pain changes, there is always the risk of prolonged functional impairment requiring close patient monitoring with regular assessments and reassessments and high level medical decision making at every office visit. The amount and complexity of data reviewed is high given the patient clinical presentation, labs,  data, radiology reports, and other tests as discussed during office visits. Pertinent data whether positive or negative were taken in consideration in the process of making this high level medical decision.

## 2024-03-13 ENCOUNTER — HOSPITAL ENCOUNTER (OUTPATIENT)
Dept: RADIOLOGY | Facility: CLINIC | Age: 84
Discharge: HOME | End: 2024-03-13
Payer: MEDICARE

## 2024-03-13 ENCOUNTER — OFFICE VISIT (OUTPATIENT)
Dept: HEMATOLOGY/ONCOLOGY | Facility: CLINIC | Age: 84
End: 2024-03-13
Payer: MEDICARE

## 2024-03-13 VITALS
HEART RATE: 58 BPM | BODY MASS INDEX: 31.47 KG/M2 | DIASTOLIC BLOOD PRESSURE: 84 MMHG | TEMPERATURE: 98.4 F | WEIGHT: 172.07 LBS | OXYGEN SATURATION: 94 % | SYSTOLIC BLOOD PRESSURE: 158 MMHG

## 2024-03-13 DIAGNOSIS — Z85.3 ENCOUNTER FOR FOLLOW-UP SURVEILLANCE OF BREAST CANCER: ICD-10-CM

## 2024-03-13 DIAGNOSIS — Z08 ENCOUNTER FOR FOLLOW-UP SURVEILLANCE OF BREAST CANCER: ICD-10-CM

## 2024-03-13 DIAGNOSIS — N63.24 MASS OF LOWER INNER QUADRANT OF LEFT BREAST: ICD-10-CM

## 2024-03-13 DIAGNOSIS — C50.912 MALIGNANT NEOPLASM OF LEFT BREAST IN FEMALE, ESTROGEN RECEPTOR NEGATIVE, UNSPECIFIED SITE OF BREAST (MULTI): ICD-10-CM

## 2024-03-13 DIAGNOSIS — Z17.1 MALIGNANT NEOPLASM OF LEFT BREAST IN FEMALE, ESTROGEN RECEPTOR NEGATIVE, UNSPECIFIED SITE OF BREAST (MULTI): ICD-10-CM

## 2024-03-13 DIAGNOSIS — C50.912 MALIGNANT NEOPLASM OF LEFT BREAST IN FEMALE, ESTROGEN RECEPTOR NEGATIVE, UNSPECIFIED SITE OF BREAST (MULTI): Primary | ICD-10-CM

## 2024-03-13 DIAGNOSIS — Z92.3 S/P RADIATION THERAPY: ICD-10-CM

## 2024-03-13 DIAGNOSIS — Z17.1 MALIGNANT NEOPLASM OF LEFT BREAST IN FEMALE, ESTROGEN RECEPTOR NEGATIVE, UNSPECIFIED SITE OF BREAST (MULTI): Primary | ICD-10-CM

## 2024-03-13 PROCEDURE — 3077F SYST BP >= 140 MM HG: CPT | Performed by: NURSE PRACTITIONER

## 2024-03-13 PROCEDURE — 76642 ULTRASOUND BREAST LIMITED: CPT | Mod: LEFT SIDE | Performed by: STUDENT IN AN ORGANIZED HEALTH CARE EDUCATION/TRAINING PROGRAM

## 2024-03-13 PROCEDURE — 76642 ULTRASOUND BREAST LIMITED: CPT | Mod: LT

## 2024-03-13 PROCEDURE — 77061 BREAST TOMOSYNTHESIS UNI: CPT | Mod: LT

## 2024-03-13 PROCEDURE — 1157F ADVNC CARE PLAN IN RCRD: CPT | Performed by: NURSE PRACTITIONER

## 2024-03-13 PROCEDURE — 76982 USE 1ST TARGET LESION: CPT | Mod: LT

## 2024-03-13 PROCEDURE — 99215 OFFICE O/P EST HI 40 MIN: CPT | Performed by: NURSE PRACTITIONER

## 2024-03-13 PROCEDURE — 77065 DX MAMMO INCL CAD UNI: CPT | Mod: LEFT SIDE | Performed by: STUDENT IN AN ORGANIZED HEALTH CARE EDUCATION/TRAINING PROGRAM

## 2024-03-13 PROCEDURE — G0279 TOMOSYNTHESIS, MAMMO: HCPCS | Mod: LEFT SIDE | Performed by: STUDENT IN AN ORGANIZED HEALTH CARE EDUCATION/TRAINING PROGRAM

## 2024-03-13 PROCEDURE — 1036F TOBACCO NON-USER: CPT | Performed by: NURSE PRACTITIONER

## 2024-03-13 PROCEDURE — 1125F AMNT PAIN NOTED PAIN PRSNT: CPT | Performed by: NURSE PRACTITIONER

## 2024-03-13 PROCEDURE — 3079F DIAST BP 80-89 MM HG: CPT | Performed by: NURSE PRACTITIONER

## 2024-03-13 PROCEDURE — 1160F RVW MEDS BY RX/DR IN RCRD: CPT | Performed by: NURSE PRACTITIONER

## 2024-03-13 PROCEDURE — 1159F MED LIST DOCD IN RCRD: CPT | Performed by: NURSE PRACTITIONER

## 2024-03-13 ASSESSMENT — PAIN SCALES - GENERAL: PAINLEVEL: 5

## 2024-03-13 NOTE — PROGRESS NOTES
Patient ID: Allyson Granados is a 84 y.o. female.  BREAST CANCER DIAGNOSIS:  Breast  AJCC Edition: 8th (AJCC), Diagnosis Date: Mar 2023, IIIA, pT2 pN1a cM0 G3   s/p left partial mastectomy 1/19/23. Had 1/4 involved nodes and an ~2cm tumor with negative margins. Blueprint came back as basal subtype.  Declined adjuvant capecitabine with my colleague Dr Bates. Due to CT scans for radiation planning axillary adenopathy was identified. She then underwent ax dissection on 7/24/23 which revealed 13/23 nodes.      9/12/23 -10/16/23: Radiation therapy with Dr Odessa Nicole to the left breast and comprehensive lymph nodes consisting of a dose of 61.25 Gy given in 25 fractions of 2.45 Gy per fraction. ABC used for cardiac sparing.            Past Medical History: Allyson has a past medical history of Breast cancer (CMS/HCC), Personal history of irradiation, Personal history of other diseases of the circulatory system, Personal history of other diseases of the musculoskeletal system and connective tissue, Personal history of other diseases of urinary system, Personal history of other endocrine, nutritional and metabolic disease, Unspecified asthma, uncomplicated (12/21/2013), and Unspecified dementia, unspecified severity, without behavioral disturbance, psychotic disturbance, mood disturbance, and anxiety (CMS/HCC) (12/02/2020).CHF, Afib (on Eliquis), CKD (stage 3), HTN, COPD, hypothyroid, chronic lumbar pain, renal calculi  Surgical History:  Allyson has a past surgical history that includes Breast lumpectomy (Left, 01/19/2023) and Hysterectomy.partial thyroidectomy, hysterectomy, hemorrhoidectomy, cholecystectomy   Social History:  Allyson reports that she has never smoked. She has never used smokeless tobacco. She reports that she does not drink alcohol and does not use drugs.Lives with son in [place]. Enjoy anish. No ETOH/tobacco/illicits.  Born in [facility], moved at 17. 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    GYN Hx: menarche age 10, , first birth age 25, she did not breastfeed, no OCPs, menopause age 34 (she  underwent total hysterectomy with removal of bilateral ovaries at age 34 for bleeding), HRT x 20 years, scattered fibroglandular  tissue    Family History:  Sister with breast cancer.  Family History   Problem Relation Name Age of Onset    Breast cancer Sister  60     Family Oncology History:  Cancer-related family history includes Breast cancer (age of onset: 60) in her sister.    BREAST CANCER DIAGNOSIS  [id number] basal type, i.e. TNBC ER-/HER2-/MS low 5%.    HISTORY OF PRESENT ILLNESS:  Allyson Granados is a 84 y.o. female who presents today for breast cancer follow up and surveillance. She is observational therapy from recent  triple negative breast cancer. She is concerned about a possible new lump in the left breast. She additionally reports there is chronic pain in the left chest wall and when holding this area 2 weeks ago she felt the left breast mass on the underside. She reports skin has healed since xrt completed. She denies any new masses or lesions that she is concerned about with exception to recently had 3 basal cell lesions removed from the top of her scalp.     She denies any chest pain or breathing issues. She reports baseline sob from prior known COPD and asthma.      She is chronically on Vicodin for back pain- this makes her forgetful sometimes sees double. She has known cataracts but does not plan to have surgery. She has baseline dizziness and balance issues but denies any falls. She uses a cane and rolling walker. She denies any new increases in her chronic pain.     She reports since surgery she has a metallic taste in her mouth- struggles with eating and lost 10lbs over the past 6+ months.  She has chronic issues with constipation. She reports normal urination.     She reports baseline issues with sleep, dozes on and off during the day. She  reports chronically fatigued    Review of Systems - Oncology  ROS 14 points performed, See HPI for exceptions    OBJECTIVE:    VS / Pain:  /84 (BP Location: Other (Comment), Patient Position: Sitting, BP Cuff Size: Adult) Comment (BP Location): R WRIST  Pulse 58   Temp 36.9 °C (98.4 °F) (Temporal)   Wt 78 kg (172 lb 1.1 oz)   SpO2 94%   BMI 31.47 kg/m²   BSA: 1.85 meters squared   Pain Scale: 5  ECO- Restricted in physically strenuous activity.  Carries out light duty.      Performance Status:       Physical Exam  Constitutional: Well developed, awake/alert/oriented x4, no distress, alert and cooperative  EYES: Sclera clear  ENMT: mucous membranes moist, no apparent injury, no lesions seen  Head/Neck: Neck supple, no apparent injury, thyroid without mass or tenderness, No JVD, trachea midline, no bruits  Respiratory / Thoracic: Patent airways, clear to all lobes, normal breath sounds with good chest expansion, thorax symmetric.  Cardiovascular: Regular, rate and rhythm, no murmurs, 2+ equal pulses of the extremities, normal auscultated S 1and S 2  GI: Nondistended, soft, non-tender, no rebound tenderness or guarding, no masses palpable, no organomegaly, +BS, no bruits  Musculoskeletal: ROM intact, no joint swelling, normal strength, no spinal tenderness  Extremities: normal extremities, no cyanosis edema, contusions or wounds, no clubbing  Neurological: alert and oriented x4, intact senses, motor, response and reflexes, normal strength  Breast: Right breast free of any masses or lesions. Left breast s/p partial mastectomy and Radiation therapy. Generalized radiation related skin tissue thickening mid breast, <1cm palpable mass 4:00 8.5cm FN.  Lymphatic: No cervical, supraclavicular, infraclavicular or axillary lymphadenopathy  Psychological: Appropriate and talkative mood and behavior  Skin: Warm and dry, no lesions, no rashes, no jaundice    Diagnostic Results     Narrative & Impression  Interpreted  By:  Frandy Abarca,   STUDY:  BI MAMMO LEFT DIAGNOSTIC TOMOSYNTHESIS; BI US BREAST LIMITED LEFT;  3/13/2024 11:35 am; 3/13/2024 11:16 am      ACCESSION NUMBER(S):  [id number]; [id number]      ORDERING CLINICIAN:  WILFREDO HUANG      INDICATION:  Diagnostic evaluation of a palpable area of clinical concern in the  left breast initially felt by the patient and marked by referring  clinician. History of left breast cancer with axillary troy  metastasis treated with lumpectomy, axillary dissection and  radiation. Family history of breast cancer.      COMPARISON:  Screening mammogram 01/25/2024, and all relevant prior breast imaging  exams available at the time of dictation.      FINDINGS:  ULTRASOUND: Targeted ultrasound with elastography was performed by a  registered sonographer in the area of palpable clinical concern felt  by the patient and marked by referring medical oncology provider. An  oval circumscribed complex cystic and solid mass without definite  vascular solid components are identified at the site of palpable  clinical concern at 3 o'clock, 9 cm from the nipple measuring 4.1 x  1.5 x 2.1 cm. No internal vascularity is identified. Increased  stiffness was noted along the periphery of the mass. Skin thickening  is noted at this site.      A BB marker was placed on the skin over this finding and additional  mammographic views were obtained.      MAMMOGRAPHY: 2D and tomosynthesis images were reviewed at 1 mm slice  thickness.      Density:  The breast tissue is heterogeneously dense, which may  obscure small masses.      The BB marker corresponds with an area of fat necrosis at the  lumpectomy site. Postsurgical changes of lumpectomy are again seen in  the left breast and left axilla. Mild diffuse skin and trabecular  thickening is consistent with postradiation changes. No suspicious  masses or calcifications are identified in the left breast.      IMPRESSION:  Benign fat necrosis at the lumpectomy  site in the left breast  corresponding to the palpable area of clinical concern. Clinical  follow-up is recommended.      Patient will be due for annual screening mammography in January 2025.      BI-RADS CATEGORY:      BI-RADS Category:  2 Benign.  Recommendation:  Clinical Follow-up and Continued Annual Screening.  Recommended Date:  1 Year.  Laterality:  Bilateral.      Transthoracic Echo (TTE) Complete     [facility], [address] [place] [address]               Tel [phone] and Fax [phone]    TRANSTHORACIC ECHOCARDIOGRAM REPORT       Patient Name:      LISA WYNNE         Reading Physician:    Ernesto Naylor MD  Study Date:        2/1/2024             Ordering Provider:    Ernesto NAYLOR  MRN/PID:           [id number]             Fellow:  Accession#:        [id number]         Nurse:  Date of Birth/Age: 1940 / 84 years Sonographer:          Juan Fuller RDCS  Gender:            F                    Additional Staff:  Height:            157.48 cm            Admit Date:  Weight:            78.93 kg             Admission Status:     Outpatient  BSA:               1.80 m2              Encounter#:           [id number]                                          Department Location:  [facility] Non                                                                Invasive  Blood Pressure: 164 /78 mmHg    Study Type:    TRANSTHORACIC ECHO (TTE) COMPLETE  Diagnosis/ICD: Acute on chronic diastolic (congestive) heart failure                 (CHF)-I50.33  Indication:    Acute on chronic diastolic heart failure; Dyspnea on exertion  CPT Code:      Echo Complete w Full [id number]    Patient History:  Pertinent History: Breast cancer, CHF, COPD, Afib, CKD, HTN, Hypothyroid.    Study Detail:  The following Echo studies were performed: 2D, M-Mode, Doppler and                color flow. Technically challenging study due to body habitus.       PHYSICIAN INTERPRETATION:  Left Ventricle: The left ventricular systolic function is normal, with an estimated ejection fraction of 65-70%. Estimated left ventricular mass is normal. There are no regional wall motion abnormalities. The left ventricular cavity size is normal. The left ventricular septal wall thickness is normal. There is normal left ventricular posterior wall thickness. Spectral Doppler shows an impaired relaxation pattern of left ventricular diastolic filling.  Left Atrium: The left atrium is normal in size. Increased thickness of the atrial septum (sparing the fossa ovalis) is present. This is consistent with lipomatous hypertrophy of the atrial septum.  Right Ventricle: The right ventricle is normal in size. There is normal right ventricular global systolic function.  Right Atrium: The right atrium is normal in size.  Aortic Valve: The aortic valve appears structurally normal. The aortic valve appears tricuspid. There is mild to moderate aortic valve cusp calcification. There is evidence of mild aortic valve stenosis.  The aortic valve dimensionless index is 0.44. There is mild aortic valve regurgitation. The peak instantaneous gradient of the aortic valve is 14.1 mmHg. The mean gradient of the aortic valve is 8.1 mmHg.  Mitral Valve: The mitral valve is normal in structure. There is no evidence of mitral valve regurgitation.  Tricuspid Valve: The tricuspid valve is structurally normal. There is trace to mild tricuspid regurgitation.  Pulmonic Valve: The pulmonic valve is structurally normal. There is no indication of pulmonic valve regurgitation.  Pericardium: There is no pericardial effusion noted.  Aorta: The aortic root is normal.  Pulmonary Artery: The tricuspid regurgitant velocity is 2.20 m/s, and with an estimated right atrial pressure of 3  mmHg, the estimated pulmonary artery pressure is normal with the RVSP at 22.3 mmHg.  Systemic Veins: The inferior vena cava appears to be of normal size.  In comparison to the previous echocardiogram(s): Compared with study from 1/25/2023, no significant change.       CONCLUSIONS:   1. Left ventricular systolic function is normal with a 65-70% estimated ejection fraction.   2. Poorly visualized anatomical structures due to suboptimal image quality.   3. Spectral Doppler shows an impaired relaxation pattern of left ventricular diastolic filling.   4. Mild aortic valve stenosis.   5. Mild aortic valve regurgitation.   6. Lipomatous hypertrophy of the atrial septum.    QUANTITATIVE DATA SUMMARY:  2D MEASUREMENTS:                           Normal Ranges:  Ao Root d:     3.40 cm   (2.0-3.7cm)  LAs:           3.91 cm   (2.7-4.0cm)  IVSd:          1.08 cm   (0.6-1.1cm)  LVPWd:         0.92 cm   (0.6-1.1cm)  LVIDd:         4.75 cm   (3.9-5.9cm)  LVIDs:         2.97 cm  LV Mass Index: 92.9 g/m2  LV % FS        37.4 %    LA VOLUME:                               Normal Ranges:  LA Vol A4C:       35.7 ml    (22+/-6mL/m2)  LA Vol A2C:       50.1 ml  LA Vol BP:        43.5 ml  LA Vol Index A4C: 19.8ml/m2  LA Vol Index A2C: 27.8 ml/m2  LA Vol Index BP:  24.1 ml/m2  LA Volume Index:  24.2 ml/m2  LA Vol A4C:       33.0 ml  LA Vol A2C:       48.8 ml    RA VOLUME BY A/L METHOD:                        Normal Ranges:  RA Area A4C: 12.2 cm2    AORTA MEASUREMENTS:                     Normal Ranges:  Asc Ao, d: 3.40 cm (2.1-3.4cm)    LV SYSTOLIC FUNCTION BY 2D PLANIMETRY (MOD):                      Normal Ranges:  EF-A4C View: 72.4 % (>=55%)  EF-A2C View: 59.7 %  EF-Biplane:  66.9 %    LV DIASTOLIC FUNCTION:                               Normal Ranges:  MV Peak E:        0.66 m/s   (0.7-1.2 m/s)  MV Peak A:        1.09 m/s   (0.42-0.7 m/s)  E/A Ratio:        0.60       (1.0-2.2)  MV e'             0.06 m/s   (>8.0)  E/e' Ratio:       11.01       (<8.0)  MV DT:            212 msec   (150-240 msec)  PulmV Sys Ash:    60.26 cm/s  PulmV Joy Ash:   28.42 cm/s  PulmV S/D Ash:    2.12  PulmV A Revs Ash: 24.56 cm/s    MITRAL VALVE:                  Normal Ranges:  MV DT: 212 msec (150-240msec)    AORTIC VALVE:                                     Normal Ranges:  AoV Vmax:                1.88 m/s  (<=1.7m/s)  AoV Peak P.1 mmHg (<20mmHg)  AoV Mean P.1 mmHg  (1.7-11.5mmHg)  LVOT Max Ash:            0.91 m/s  (<=1.1m/s)  AoV VTI:                 50.46 cm  (18-25cm)  LVOT VTI:                21.98 cm  LVOT Diameter:           1.89 cm   (1.8-2.4cm)  AoV Area, VTI:           1.23 cm2  (2.5-5.5cm2)  AoV Area,Vmax:           1.37 cm2  (2.5-4.5cm2)  AoV Dimensionless Index: 0.44    AORTIC INSUFFICIENCY:  AI Vmax:       3.58 m/s  AI Half-time:  865 msec  AI Decel Time: 2981 msec  AI Decel Rate: 119.98 cm/s2       RIGHT VENTRICLE:  RV Basal 2.90 cm  RV Mid   2.30 cm  RV Major 5.4 cm  TAPSE:   26.0 mm  RV s'    0.12 m/s    TRICUSPID VALVE/RVSP:                              Normal Ranges:  Peak TR Velocity: 2.20 m/s  RV Syst Pressure: 22.3 mmHg (< 30mmHg)  IVC Diam:         1.40 cm    PULMONIC VALVE:                       Normal Ranges:  PV Max Ash: 0.8 m/s  (0.6-0.9m/s)  PV Max P.6 mmHg    Pulmonary Veins:  PulmV A Revs Ash: 24.56 cm/s  PulmV Joy Ash:   28.42 cm/s  PulmV S/D Ash:    2.12  PulmV Sys Ash:    60.26 cm/s       [id number] Chung Mitchell MD  Electronically signed on 2024 at 1:19:12 AM       ** Final **       BI mammo bilateral diagnostic tomosynthesis 2024    Narrative  Interpreted By:  Ann-Marie Reyna,  STUDY:  BI MAMMO BILATERAL DIAGNOSTIC TOMOSYNTHESIS;  2024 9:46 am    ACCESSION NUMBER(S):  [id number]    ORDERING CLINICIAN:  SPENCER HUANG    INDICATION:  History of left breast lumpectomy and radiation. New baseline  mammogram following surgery.    COMPARISON:  2023, 06/10/2017.    FINDINGS:  2D and  tomosynthesis images were reviewed at 1 mm slice thickness.    Density:  The breast tissue is heterogeneously dense, which may  obscure small masses.    Postoperative scarring and surgical clips are seen in the central  lateral left breast at mid to posterior depth. There is mild left  breast skin thickening, consistent with post radiation changes. No  suspicious masses or calcifications are identified.    Impression  No mammographic evidence of malignancy.  Posttreatment changes of the  left breast.    BI-RADS CATEGORY:    BI-RADS Category:  2 Benign.  Recommendation:  Routine Screening Mammogram in 1 Year.  Recommended Date:  1 Year.  Laterality:  Bilateral.    For any future breast imaging appointments, please call [phone]).      MACRO:  None    Signed by: Ann-Marie Reyna 1/25/2024 11:03 AM  Dictation workstation:   [id number]     Lab Results   Component Value Date    WBC 8.2 07/19/2023    HGB 14.3 07/19/2023    HCT 46.1 (H) 07/19/2023    MCV 91 07/19/2023     07/19/2023        Chemistry    Lab Results   Component Value Date/Time     01/26/2024 0958    K 3.2 (L) 01/26/2024 0958     01/26/2024 0958    CO2 30 01/26/2024 0958    BUN 16 01/26/2024 0958    CREATININE 1.15 (H) 01/26/2024 0958    Lab Results   Component Value Date/Time    CALCIUM 8.8 01/26/2024 0958    ALKPHOS 42 01/26/2024 0958    AST 15 01/26/2024 0958    ALT 14 01/26/2024 0958    BILITOT 0.5 01/26/2024 0958            Assessment/Plan   Malignant neoplasm of breast (CMS/HCC), Pathologic: Stage IIIA (pT2, pN1a, cM0, G3, ER-, SD-, HER2-)  Diagnoses and all orders for this visit:  Malignant neoplasm of left breast in female, estrogen receptor negative, unspecified site of breast (CMS/HCC) (Primary)  S/P radiation therapy  Encounter for follow-up surveillance of breast cancer    Problem List Items Addressed This Visit       Malignant neoplasm of breast (CMS/HCC) - Primary    Encounter for follow-up surveillance of breast  cancer    S/P radiation therapy    1. stage III [id number] G3 ER-/[place] negative , Her2 (1+) IDC, i.e. TNBC.      s/p left mastectomy 1/19/23, 1 of 4 LN, 2cm mass. Mammopx Lum B. Pt declined Capecitabine. Additional axillary dissection 7/2023 revealed 13/23 nodes. Completion Radiation therapy. Planned observational therapy moving forward.     New mass self palpated, appreciated on exam. Left Diagnostic mammogram with ultrasound revealing a Cyst, CAT 2. Planned return to annual imaging. Updates sent to Dr Brianda Hdez for updates.     There is no evidence found on clinical for breast cancer recurrence. She will continue to follow in shared surveillance visits between myself and Dr Bates in Medical oncology, Rad onc and breast surgery.       General Health Maintenance  PCP Kristin Orantes CNP annually and as needed.  Please continue to follow with chronic pain team Dr England    At least 35 minutes of direct consultation was spent with the patient today reviewing her cancer care plan, educating and answering questions regarding ongoing follow up, greater than 50% in counseling and coordination of care      Treatment Plan:  [No matching plan found]       Thank you for the opportunity to be involved in the care of Allyson Granados.   We discussed the clinical significance of diagnosis, goals of care and treatment plan in detail.   Please do not hesitate to reach out with any questions. Thank you.     -------------------------------------------------------------------------------------------------------------------------------  Suyapa Hdez MSN, APRN, FNP-C  [facility]  Division of Medical Oncology- Breast   Collaborating Physician Dr. Boy Bates   Team Nurse Partners Modesta Rosas, Chelsi [place]erna [place] [address]  Phone: [phone]  Fax: [phone]  Available via Secure Chat    Confidential Peer Review  Document-  Privilege  Privileged Pursuant to [place] Revised Code Section 2305.24, .25, .251 & .252

## 2024-03-13 NOTE — PATIENT INSTRUCTIONS
Dr Boy Bates / Suyapa Hdez APRN, CNP follow up  Sept 2024    Ordered mammogram and ultrasound today to evaluate possible mass in the left breast was identified as a cyst. Planned return to annual imaging.     Breast surgery Dr Brianda Hdez / Breast surgery NP July 2024- please schedule, will complete annual mammogram Jan 2025    Rad Once follow up Emy Anthony CNP 6/21/24    Please call [phone] with any questions or concerns prior to your next office visit.   
Family

## 2024-04-02 DIAGNOSIS — K21.9 GASTROESOPHAGEAL REFLUX DISEASE WITHOUT ESOPHAGITIS: Primary | ICD-10-CM

## 2024-04-02 RX ORDER — PRAVASTATIN SODIUM 40 MG/1
40 TABLET ORAL
Qty: 90 TABLET | Refills: 0 | Status: SHIPPED | OUTPATIENT
Start: 2024-04-02 | End: 2024-04-12

## 2024-04-12 DIAGNOSIS — B37.89 CANDIDIASIS OF BREAST: ICD-10-CM

## 2024-04-12 DIAGNOSIS — K21.9 GASTROESOPHAGEAL REFLUX DISEASE WITHOUT ESOPHAGITIS: ICD-10-CM

## 2024-04-12 RX ORDER — PRAVASTATIN SODIUM 40 MG/1
40 TABLET ORAL DAILY
Qty: 90 TABLET | Refills: 0 | Status: SHIPPED | OUTPATIENT
Start: 2024-04-12

## 2024-04-12 RX ORDER — NYSTATIN 100000 [USP'U]/G
POWDER TOPICAL AS NEEDED
Qty: 60 G | Refills: 0 | Status: SHIPPED | OUTPATIENT
Start: 2024-04-12

## 2024-05-02 ENCOUNTER — OFFICE VISIT (OUTPATIENT)
Dept: PAIN MEDICINE | Facility: CLINIC | Age: 84
End: 2024-05-02
Payer: MEDICARE

## 2024-05-02 DIAGNOSIS — M51.26 HERNIATED NUCLEUS PULPOSUS, L4-5 RIGHT: ICD-10-CM

## 2024-05-02 DIAGNOSIS — M51.27 HERNIATED NUCLEUS PULPOSUS, L5-S1, RIGHT: ICD-10-CM

## 2024-05-02 DIAGNOSIS — M96.1 FAILED BACK SYNDROME OF LUMBAR SPINE: ICD-10-CM

## 2024-05-02 DIAGNOSIS — M47.816 SPONDYLOSIS OF LUMBAR REGION WITHOUT MYELOPATHY OR RADICULOPATHY: ICD-10-CM

## 2024-05-02 DIAGNOSIS — Z79.891 LONG TERM CURRENT USE OF OPIATE ANALGESIC: Primary | ICD-10-CM

## 2024-05-02 PROCEDURE — 1159F MED LIST DOCD IN RCRD: CPT | Performed by: PHYSICAL MEDICINE & REHABILITATION

## 2024-05-02 PROCEDURE — 99214 OFFICE O/P EST MOD 30 MIN: CPT | Performed by: PHYSICAL MEDICINE & REHABILITATION

## 2024-05-02 PROCEDURE — 1160F RVW MEDS BY RX/DR IN RCRD: CPT | Performed by: PHYSICAL MEDICINE & REHABILITATION

## 2024-05-02 PROCEDURE — 1157F ADVNC CARE PLAN IN RCRD: CPT | Performed by: PHYSICAL MEDICINE & REHABILITATION

## 2024-05-02 RX ORDER — HYDROCODONE BITARTRATE AND ACETAMINOPHEN 10; 325 MG/1; MG/1
1 TABLET ORAL 3 TIMES DAILY
Qty: 84 TABLET | Refills: 0 | Status: SHIPPED | OUTPATIENT
Start: 2024-05-17 | End: 2024-06-14

## 2024-05-02 RX ORDER — HYDROCODONE BITARTRATE AND ACETAMINOPHEN 10; 325 MG/1; MG/1
1 TABLET ORAL 3 TIMES DAILY
Qty: 84 TABLET | Refills: 0 | Status: SHIPPED | OUTPATIENT
Start: 2024-06-14 | End: 2024-07-12

## 2024-05-02 NOTE — PROGRESS NOTES
Chief complaint  Back pain     History  Allyson Granados is back for pain management office visit  Continue with back pain . The breast mets are controlled not. Continues with onco  Back pain radiating to the R lowr limb  Long discussion about putting effort in cutting back on pain medications. Discussed about pain level changing and we do not know if the medications at this amount are still needed until we try and cut back slowly on pain medications. If the cut is tolerated then we continue. If cut not tolerated then, will go back on the pain medications level.  The goal from this is to keep the pain medications at the lowest effective dose.  I discussed about  about considering increasing the dose of the long acting and cut back the number of doses of the short acting medications. That will decrease the number of doses being dispensed daily.    We are not going to consider further cut back now bc of the history of pain       Pain level without medication is 7/10 , with the medication pain level 0/10.     The pain meds are helping control the pain and improving Activities of Daily living and quality of life and quality of sleep.    opioids treatment agreement Jan 2024  Pill count today, using count tray, and in front of patient :  47    pills , last fill was on 4/19  for 84 hydrcodone 10 mg tabs,  the count is correct  Oarrs pulled and scanned in the chart  no concerns  last urine toxicology testing earlier this year and it was compliant we will repeat  Xray updated spine   ORT Score is  0  Pain pathology and pain generators spine (history of recurrent breast mets but we are treating her for the back pain. The mets are treated and not painful  Modalities tried injection, surgery, physical therapy, TENS unit, nonsteroidal anti-inflammatory medication multiple surgeries      Denied any fever or chills. No weight loss and no night sweats. No cough or sputum production. No diarrhea   The constipation has been responding  to fibers and over the counter medications.     No bladder and bowel incontinence and no other changes in bladder and bowel. No skin changes.  Reports tiredness and fatigability only if the pain is not controlled.   Denied opioids diversion and abuse and denies alcoholism. Denies overuse of the pain medications.    The control of the pain with the pain medications is helping the control of the symptoms and allowing the function and activities of daily living, enjoyment of life, improving the quality of life and sleep with less interruption by the pain. The goal is symptomatic control of the nonmalignant chronic pain and not to repair the permanent damage in the tissues inducing the chronic pain conditions. We are aiming to shift the focus from the nonmalignant chronic pain to other aspects of life by symptomatically treating this chronic pain. If this pain is not treated it will lead to major morbidity and it is also associated with increased risks of mortality. The patient understands those very clearly and also understand high risks of morbidity and mortality if not strictly adherent to the treatment recommendations and reporting any associated side effects. Also patient understand the full responsibility associated with these medications to avoid abuse or overuse or any use of these medications for anything besides treating the patient's own chronic pain and nothing else under any circumstances.        Physical examination  Awake, alert and oriented for time place and persons   declined Chaperone for the visit and was adequately  draped for the exam.    Healed lspi incision  Dulce negative   Cane bc of knee pain   plantar cutaneous reflex are down going bilaterally  .[email or web address]     Diagnosis  Problem List Items Addressed This Visit       Failed back syndrome of lumbar spine    DJD (degenerative joint disease), lumbar    Herniated nucleus pulposus, L4-5 right    Herniated nucleus pulposus, L5-S1, right    Relevant  Medications    HYDROcodone-acetaminophen (Norco)  mg tablet (Start on 5/17/2024)    HYDROcodone-acetaminophen (Norco)  mg tablet (Start on 6/14/2024)    Long term current use of opiate analgesic - Primary    Relevant Orders    Opiate/Opioid/Benzo Prescription Compliance        Plan  Reviewed the pain generators.  Went over the types of pain with neuropathic and nociceptive and different pathologies and therapeutic modalities. Discussed the mechanism of action of interventions from acupuncture, physical therapy , regular exercises, injections, botox, spinal cord stimulation, and role of surgery     Went over pathology of the intervertebral disc displacement and the anatomical relation to the Nerve roots and relation to the radicular symptoms. Went over treatment modalities with conservative treatment including acupuncture   and epidural steroid injection with fluoroscopy guidance and last resort of surgery    Based on the above findings and the clinical response to the opioids medications and improvement of the activities of daily living, sleep, and work performance. We made this complex decision to continue the opioids therapy in light of the evidence of the patient's responsibility in using the pain medications as prescribed for the nonmalignant chronic pain condition. We discussed about the use of the pain medications to treat the symptoms of chronic nonmalignant pain and we are not trying the repair the permanent damage in the tissues, rather we are trying to control the symptoms induced by the permanent damage to the tissues inducing the chronic pain condition and resulting disability. I explained the difference and discussed it with the patient and stressed the importance of knowing the difference especially because of the potential side effects and the potential addicting effect and habit forming nature of the dangerous drugs we are using to treat the symptoms of the chronic pain.      We discussed  that we are prescribing the medications on good miri and legitimate medical reason.     We reviewed the side effects and precautions of opioids prescriptions as discussed in the opioids treatment agreement.    realizes the interaction between the therapeutic classes including the respiratory depression and potential death     Random drug testing twice in 6 months we will submit     Hydrocodone 10 tid . No plan to cut back on pain meds   has a narcan at home know how and when to use it if needed.   Consisder MOY for aggravation  of pain pain. No further increase in pain meds       Discussed about NSAIDS and I explained about the opioids sparing effect to allow keeping the opioids dose at minimal effective dose.   I went over the potential side effects of the NSAIDS on the gastrointestinal, renal and cardiovascular systems.      I detailed the side effects from the acetaminophen in the medication and made aware of those. I also explained about the cumulative effects on the organs and mainly the liver.     Given the opioids therapy , we discussed about the risk for accidental over dose on the pain medications, either for patient or other household. I went over the mechanism of action and mode of use of the Naloxone according to the  recommendations. I will provide a prescription for a kit.     Follow-up 8 weeks or earlier if needed     The level of clinical decision making in this office visit,  is high, given the high risks of complications with the morbidity and mortality due to the fact that acute and chronic pain may pose a threat to life and bodily function, if under treated, poorly treated, or with failure to maintain adequate treatment and timely medical follow up. Additionally over treatment has its own set of complications including overdosing on the pain medications and also the habit forming potentials with the use of the medications used to treat chronic painful conditions including therapeutic  classes classified as dangerous medications. Given the serious and fluctuating nature of pain level and instensity with extensive consideration for whenever pain changes, there is always the risk of prolonged functional impairment requiring close patient monitoring with regular assessments and reassessments and high level medical decision making at every office visit. The amount and complexity of data reviewed is high given the patient clinical presentation, labs,  data, radiology reports, and other tests as discussed during office visits. Pertinent data whether positive or negative were taken in consideration in the process of making this high level medical decision.

## 2024-05-08 ENCOUNTER — OFFICE VISIT (OUTPATIENT)
Dept: PRIMARY CARE | Facility: CLINIC | Age: 84
End: 2024-05-08
Payer: MEDICARE

## 2024-05-08 VITALS
DIASTOLIC BLOOD PRESSURE: 63 MMHG | BODY MASS INDEX: 31.83 KG/M2 | WEIGHT: 173 LBS | HEIGHT: 62 IN | SYSTOLIC BLOOD PRESSURE: 132 MMHG | HEART RATE: 68 BPM

## 2024-05-08 DIAGNOSIS — E78.5 HYPERLIPIDEMIA, UNSPECIFIED HYPERLIPIDEMIA TYPE: ICD-10-CM

## 2024-05-08 DIAGNOSIS — G25.81 RESTLESS LEGS SYNDROME (RLS): ICD-10-CM

## 2024-05-08 DIAGNOSIS — I48.0 PAROXYSMAL ATRIAL FIBRILLATION (MULTI): ICD-10-CM

## 2024-05-08 DIAGNOSIS — J45.20 MILD INTERMITTENT ASTHMA WITHOUT COMPLICATION (HHS-HCC): ICD-10-CM

## 2024-05-08 DIAGNOSIS — N18.31 STAGE 3A CHRONIC KIDNEY DISEASE (MULTI): ICD-10-CM

## 2024-05-08 DIAGNOSIS — I10 ESSENTIAL (PRIMARY) HYPERTENSION: ICD-10-CM

## 2024-05-08 DIAGNOSIS — K21.9 GASTROESOPHAGEAL REFLUX DISEASE WITHOUT ESOPHAGITIS: ICD-10-CM

## 2024-05-08 DIAGNOSIS — I50.32 CHRONIC DIASTOLIC CONGESTIVE HEART FAILURE (MULTI): Primary | ICD-10-CM

## 2024-05-08 DIAGNOSIS — Z85.3 PERSONAL HISTORY OF BREAST CANCER: ICD-10-CM

## 2024-05-08 PROBLEM — R73.03 PREDIABETES: Status: ACTIVE | Noted: 2024-05-08

## 2024-05-08 PROCEDURE — 1157F ADVNC CARE PLAN IN RCRD: CPT | Performed by: NURSE PRACTITIONER

## 2024-05-08 PROCEDURE — 1159F MED LIST DOCD IN RCRD: CPT | Performed by: NURSE PRACTITIONER

## 2024-05-08 PROCEDURE — 99214 OFFICE O/P EST MOD 30 MIN: CPT | Performed by: NURSE PRACTITIONER

## 2024-05-08 PROCEDURE — 1160F RVW MEDS BY RX/DR IN RCRD: CPT | Performed by: NURSE PRACTITIONER

## 2024-05-08 PROCEDURE — 3078F DIAST BP <80 MM HG: CPT | Performed by: NURSE PRACTITIONER

## 2024-05-08 PROCEDURE — 3075F SYST BP GE 130 - 139MM HG: CPT | Performed by: NURSE PRACTITIONER

## 2024-05-08 PROCEDURE — 1036F TOBACCO NON-USER: CPT | Performed by: NURSE PRACTITIONER

## 2024-05-08 RX ORDER — FUROSEMIDE 20 MG/1
20 TABLET ORAL DAILY
Qty: 90 TABLET | Refills: 3 | Status: SHIPPED | OUTPATIENT
Start: 2024-05-08

## 2024-05-08 RX ORDER — POTASSIUM CHLORIDE 750 MG/1
10 TABLET, FILM COATED, EXTENDED RELEASE ORAL DAILY
Qty: 90 TABLET | Refills: 3 | Status: SHIPPED | OUTPATIENT
Start: 2024-05-08

## 2024-05-08 ASSESSMENT — ENCOUNTER SYMPTOMS
DEPRESSION: 0
LOSS OF SENSATION IN FEET: 0
OCCASIONAL FEELINGS OF UNSTEADINESS: 1

## 2024-05-08 NOTE — PROGRESS NOTES
"Subjective   Patient ID: Allyson Granados is a 84 y.o. female who presents for chronic care.     HPI     1. Hypertension  2. CHF  3. Afib  4. Hyperlipidemia  Current meds: amiodarone 200 mg every day, eliquis 5 mg BID, furosemide 20 mg every day, losartan 50 mg every day, metoprolol XL 50 mg every day, potassium 10 meq every day, pravastatin 40 mg every day   Home blood pressure monitoring: she used to but stopped because it was always high  Salt intake: does not pay attention, but does not add  Caffeine intake: none  Diet: good  Exercise: none but active   Alcohol use: none  Tobacco use: none  Illicit drug use: none    Denies vision changes, headaches, dizziness, chest pain, palpitations, or edema.    5. Asthma  States she has never used albuterol  Denies shortness of breath, coughing, or wheezing    6. CKD3  eGFR 47 on 1/26/24      7. RLS  Well controlled with ropinirole    8. Hypothyroidism  TSH 3.41 on 1/26/24  Compliant with levothyroxine   Reports fatigue and hair thinning  Denies weight changes, changes in bowel habits, palpitations, anxiety, or depression    9. Chronic back pain  Following with pain management    10. Breast CA   Following with oncology   Completed radiation in 2023, planned observational therapy moving forward    Review of Systems  ROS negative except as noted above in HPI.       Objective   /63   Pulse 68   Ht 1.575 m (5' 2\")   Wt 78.5 kg (173 lb)   BMI 31.64 kg/m²     Physical Exam  General: Alert and oriented, in no acute distress. Appears stated age, well-nourished, and well hydrated  HEENT:  - Head: Normocephalic and atraumatic   - Eyes: EOMI, PERRLA  - ENT: Hearing grossly intact  Heart: RRR. No murmurs, clicks, or rubs  Lungs: Unlabored breathing. CTAB with no crackles, wheezes, or rhonchi  Abdomen: Normal BS in all 4 quadrants. Soft, non-tender, non-distended, with no masses  Extremities: Lymphedema to LUE. Warm and well perfused. No BLE edema. Normal peripheral " pulses  Musculoskeletal: Normal gait and station. Walking with cane   Neurological: Alert and oriented. No gross neurological deficits  Psychological: Appropriate mood and affect  Skin: No rash, abnormal lesions, cyanosis, or erythema      Assessment/Plan   1. Hypertension  2. CHF  3. Afib  - Follows with cardiology  - Blood pressure well controlled  - Euvolemic on exam  - Continue amiodarone 200 mg every day, eliquis 5 mg BID, furosemide 20 mg every day, losartan 50 mg every day, metoprolol XL 50 mg every day, potassium 10 meq every day    4. Hyperlipidemia  - Continue pravastatin 40 mg every day     5. Asthma  - States she has never used albuterol  - Asymptomatic    6. CKD3  - eGFR 47  - Continue to monitor, avoid nephrotoxic agents    7. RLS  - Continue ropirinole 4 mg BID    8. Hypothyroidism  - TSH 3.41  - Continue levothyroxine 50 mcg every day     9. Chronic back pain  - Following with pain management    10. Breast CA  - Following with oncology    RTC in 3 months for chronic care or sooner KRISTI Montalvo-CNP  [facility]

## 2024-06-11 DIAGNOSIS — C50.919 CARCINOMA OF BREAST METASTATIC TO ADRENAL GLAND, UNSPECIFIED LATERALITY (MULTI): Primary | ICD-10-CM

## 2024-06-11 DIAGNOSIS — C79.70 CARCINOMA OF BREAST METASTATIC TO ADRENAL GLAND, UNSPECIFIED LATERALITY (MULTI): Primary | ICD-10-CM

## 2024-06-11 NOTE — PROGRESS NOTES
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 Her PS is not good and isn't likely fit for chemo. I have ordered brain MRI and PET/CT and I will see her back in about 2 wks. They prefer [facility]. Patient also with headaches (new) and back pain (this isn't new)

## 2024-06-12 ENCOUNTER — HOSPITAL ENCOUNTER (EMERGENCY)
Facility: HOSPITAL | Age: 84
Discharge: HOME | End: 2024-06-12
Attending: EMERGENCY MEDICINE
Payer: MEDICARE

## 2024-06-12 VITALS
DIASTOLIC BLOOD PRESSURE: 64 MMHG | HEART RATE: 62 BPM | WEIGHT: 175 LBS | BODY MASS INDEX: 29.88 KG/M2 | SYSTOLIC BLOOD PRESSURE: 127 MMHG | OXYGEN SATURATION: 92 % | HEIGHT: 64 IN | RESPIRATION RATE: 18 BRPM | TEMPERATURE: 97.2 F

## 2024-06-12 DIAGNOSIS — R04.0 EPISTAXIS: Primary | ICD-10-CM

## 2024-06-12 PROCEDURE — 99283 EMERGENCY DEPT VISIT LOW MDM: CPT

## 2024-06-12 PROCEDURE — 2500000001 HC RX 250 WO HCPCS SELF ADMINISTERED DRUGS (ALT 637 FOR MEDICARE OP)

## 2024-06-12 RX ORDER — OXYMETAZOLINE HCL 0.05 %
SPRAY, NON-AEROSOL (ML) NASAL
Status: COMPLETED
Start: 2024-06-12 | End: 2024-06-12

## 2024-06-12 ASSESSMENT — PAIN - FUNCTIONAL ASSESSMENT: PAIN_FUNCTIONAL_ASSESSMENT: 0-10

## 2024-06-12 ASSESSMENT — PAIN SCALES - GENERAL
PAINLEVEL_OUTOF10: 0 - NO PAIN
PAINLEVEL_OUTOF10: 0 - NO PAIN

## 2024-06-12 ASSESSMENT — COLUMBIA-SUICIDE SEVERITY RATING SCALE - C-SSRS
6. HAVE YOU EVER DONE ANYTHING, STARTED TO DO ANYTHING, OR PREPARED TO DO ANYTHING TO END YOUR LIFE?: NO
1. IN THE PAST MONTH, HAVE YOU WISHED YOU WERE DEAD OR WISHED YOU COULD GO TO SLEEP AND NOT WAKE UP?: NO
2. HAVE YOU ACTUALLY HAD ANY THOUGHTS OF KILLING YOURSELF?: NO

## 2024-06-13 ENCOUNTER — LAB (OUTPATIENT)
Dept: LAB | Facility: LAB | Age: 84
End: 2024-06-13
Payer: MEDICARE

## 2024-06-13 DIAGNOSIS — Z79.891 LONG TERM CURRENT USE OF OPIATE ANALGESIC: ICD-10-CM

## 2024-06-13 LAB
AMPHETAMINES UR QL SCN: NORMAL
BARBITURATES UR QL SCN: NORMAL
BZE UR QL SCN: NORMAL
CANNABINOIDS UR QL SCN: NORMAL
CREAT UR-MCNC: 59.9 MG/DL (ref 20–320)
PCP UR QL SCN: NORMAL

## 2024-06-13 PROCEDURE — 80354 DRUG SCREENING FENTANYL: CPT

## 2024-06-13 PROCEDURE — 80346 BENZODIAZEPINES1-12: CPT

## 2024-06-13 PROCEDURE — 80361 OPIATES 1 OR MORE: CPT

## 2024-06-13 PROCEDURE — 82570 ASSAY OF URINE CREATININE: CPT

## 2024-06-13 PROCEDURE — 80307 DRUG TEST PRSMV CHEM ANLYZR: CPT

## 2024-06-13 PROCEDURE — 80368 SEDATIVE HYPNOTICS: CPT

## 2024-06-13 PROCEDURE — 80358 DRUG SCREENING METHADONE: CPT

## 2024-06-13 PROCEDURE — 80365 DRUG SCREENING OXYCODONE: CPT

## 2024-06-13 PROCEDURE — 80373 DRUG SCREENING TRAMADOL: CPT

## 2024-06-13 NOTE — DISCHARGE INSTRUCTIONS
Use Afrin as indicated.  Please schedule follow-up appointment with your primary care doctor.  Return immediately if concerning symptoms, as discussed.

## 2024-06-13 NOTE — ED PROVIDER NOTES
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  According to patient she denies any trauma when it started bleeding.  This was 30 minutes prior to arrival.  They applied pressure and it has since stopped.  Denies any other symptoms at this time.    PMHx: As above  PSHx: Denies pertinent  FamilyHx: Denies pertinent  SocialHx: Denies  Allergies: Per EMR  Medications: See Medication Reconciliation     ROS  As above otherwise denies      Physical Exam    GENERAL: Awake and Alert, No Acute Distress  HEENT: Faint collection of blood in the anterior nares on the right side but no blood in the oropharynx.  AT/NC, PERRL, EOMI, Normal Oropharynx, No Signs of Dehydration  NECK: Normal Inspection, No JVD  CARDIOVASCULAR: RRR, No M/R/G  RESPIRATORY: CTA Bilaterally, No Wheezes, Rales or Rhonchi, Chest Wall Non-tender  ABDOMEN: Soft, non-tender abdomen, Normal Bowel Sounds, No Distention  BACK: No CVA Tenderness  SKIN: Normal Color, Warm, Dry, No Rashes   EXTREMITIES: Non-Tender, Full ROM, No Pedal Edema  NEURO: A&O x 3, Normal Motor and Sensation, Normal Mood and Affect    Nursing Assessment and Vitals Reviewed    Medical Decision  Patient is a an 84-year-old female with a past medical history significant for atrial fibrillation on Eliquis, CKD, CHF and breast cancer were recently diagnosed with metastasis to the periorbital region on the left who presents with epistaxis on the right.  According to patient she denies any trauma when it started bleeding.  This was 30 minutes prior to arrival.  They applied pressure and it has since stopped.  Denies any other symptoms at this time.    Patient is valuated for epistaxis.  There is a small  collection of blood in the anterior nares but otherwise no other signs of bleeding.  She is given Afrin and clamp is placed.  Will reevaluate.  On reevaluation she remains without bleeding.  She has no blood around the oropharynx and it appears to have been of anterior origin.  At this time patient is stable for discharge.  Will send home with a bottle of Afrin and instructions on supportive care.  She is to follow-up with her primary care doctor.  Of note, she is noted to have MRIs and PET scan scheduled already which she is encouraged to keep.  She is educated on signs and symptoms that should prompt immediate turn to emergency room.                                Carole Coma Scale Score: 15                     Patient History   Past Medical History:   Diagnosis Date    Breast cancer (Multi)     Personal history of irradiation     Personal history of other diseases of the circulatory system     History of hypertension    Personal history of other diseases of the musculoskeletal system and connective tissue     Personal history of arthritis    Personal history of other diseases of urinary system     History of kidney disease    Personal history of other endocrine, nutritional and metabolic disease     History of thyroid disease    Unspecified asthma, uncomplicated ([facility]) 12/21/2013    Asthma    Unspecified dementia, unspecified severity, without behavioral disturbance, psychotic disturbance, mood disturbance, and anxiety (Multi) 12/02/2020    Dementia, old age     Past Surgical History:   Procedure Laterality Date    BREAST LUMPECTOMY Left 01/19/2023    HYSTERECTOMY       Family History   Problem Relation Name Age of Onset    Breast cancer Sister  60     Social History     Tobacco Use    Smoking status: Never    Smokeless tobacco: Never   Substance Use Topics    Alcohol use: Never    Drug use: Never       Physical Exam   ED Triage Vitals   Temperature Heart Rate Respirations BP   06/12/24 1856 06/12/24 1856  06/12/24 1856 06/12/24 1856   36.2 °C (97.2 °F) 66 15 (!) 190/73      Pulse Ox Temp src Heart Rate Source Patient Position   06/12/24 1910 -- -- --   100 %         BP Location FiO2 (%)     -- --             Physical Exam    ED Course & MDM   Diagnoses as of 06/12/24 2005   Epistaxis       Medical Decision Making      Procedure  Procedures     Rosalia Juan MD  06/12/24 2005

## 2024-06-17 LAB
1OH-MIDAZOLAM UR CFM-MCNC: <25 NG/ML
6MAM UR CFM-MCNC: <25 NG/ML
7AMINOCLONAZEPAM UR CFM-MCNC: <25 NG/ML
A-OH ALPRAZ UR CFM-MCNC: <25 NG/ML
ALPRAZ UR CFM-MCNC: <25 NG/ML
CHLORDIAZEP UR CFM-MCNC: <25 NG/ML
CLONAZEPAM UR CFM-MCNC: <25 NG/ML
CODEINE UR CFM-MCNC: <50 NG/ML
DIAZEPAM UR CFM-MCNC: <25 NG/ML
EDDP UR CFM-MCNC: <25 NG/ML
FENTANYL UR CFM-MCNC: <2.5 NG/ML
HYDROCODONE CTO UR CFM-MCNC: 1909 NG/ML
HYDROMORPHONE UR CFM-MCNC: 290 NG/ML
LORAZEPAM UR CFM-MCNC: <25 NG/ML
METHADONE UR CFM-MCNC: <25 NG/ML
MIDAZOLAM UR CFM-MCNC: <25 NG/ML
MORPHINE UR CFM-MCNC: <50 NG/ML
NORDIAZEPAM UR CFM-MCNC: <25 NG/ML
NORFENTANYL UR CFM-MCNC: <2.5 NG/ML
NORHYDROCODONE UR CFM-MCNC: 746 NG/ML
NOROXYCODONE UR CFM-MCNC: <25 NG/ML
NORTRAMADOL UR-MCNC: <50 NG/ML
OXAZEPAM UR CFM-MCNC: <25 NG/ML
OXYCODONE UR CFM-MCNC: <25 NG/ML
OXYMORPHONE UR CFM-MCNC: <25 NG/ML
TEMAZEPAM UR CFM-MCNC: <25 NG/ML
TRAMADOL UR CFM-MCNC: <50 NG/ML
ZOLPIDEM UR CFM-MCNC: <25 NG/ML
ZOLPIDEM UR-MCNC: <25 NG/ML

## 2024-06-21 ENCOUNTER — HOSPITAL ENCOUNTER (OUTPATIENT)
Dept: RADIATION ONCOLOGY | Facility: CLINIC | Age: 84
Setting detail: RADIATION/ONCOLOGY SERIES
Discharge: HOME | End: 2024-06-21
Payer: MEDICARE

## 2024-06-21 VITALS
OXYGEN SATURATION: 94 % | HEART RATE: 72 BPM | TEMPERATURE: 97.5 F | RESPIRATION RATE: 18 BRPM | SYSTOLIC BLOOD PRESSURE: 169 MMHG | BODY MASS INDEX: 28.68 KG/M2 | DIASTOLIC BLOOD PRESSURE: 79 MMHG | WEIGHT: 167.11 LBS

## 2024-06-21 DIAGNOSIS — C50.919 MALIGNANT NEOPLASM OF FEMALE BREAST, UNSPECIFIED ESTROGEN RECEPTOR STATUS, UNSPECIFIED LATERALITY, UNSPECIFIED SITE OF BREAST (MULTI): ICD-10-CM

## 2024-06-21 DIAGNOSIS — Z92.3 S/P RADIATION THERAPY: Primary | ICD-10-CM

## 2024-06-21 DIAGNOSIS — C50.912: ICD-10-CM

## 2024-06-21 PROCEDURE — 99214 OFFICE O/P EST MOD 30 MIN: CPT | Performed by: NURSE PRACTITIONER

## 2024-06-21 ASSESSMENT — PAIN SCALES - GENERAL: PAINLEVEL: 0-NO PAIN

## 2024-06-21 NOTE — PROGRESS NOTES
Radiation Oncology Follow-Up    Patient Name:  Allyson Granados  MRN:  [id number]  :  1940    Referring Provider: Jolene Anthony APR*  Primary Care Provider: KELLI Maciel  Care Team: Patient Care Team:  KELLI Maciel as PCP - General (Family Medicine)  Jaun England MD as Surgeon (Pain Medicine)  Chung FOWLER MD as Cardiologist (Cardiology)  KELLI Gaspar as Consulting Physician (Radiation Oncology)  Milly Mendoza MD as Surgeon (Pulmonary Disease)  Odessa Nicole MD as Radiation Oncologist (Radiation Oncology)  Ana Paula Ackerman MD as Consulting Physician (Nephrology)  Boy Bates MD as Consulting Physician (Hematology and Oncology)    Date of Service: 2024     Cancer Staging:          Breast         AJCC Edition: 8th (AJCC), Diagnosis Date: Mar 2023, IIIA, pT2 pN1a cM0 G3     History of Present Illness:      Chief Complaint: 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   Interval History:    84-year-old female who appreciated a mass in her left breast.  She underwent a mammogram on 2023 which revealed an irregular spiculated mass  in the upper outer quadrant of the left breast as well as a prominent left axillary lymph node.  Ultrasound directed at the 3 o'clock position, 6 cm from the nipple revealed a 2.1 x 1 x 1.7 cm hypoechoic mass.  Axillary ultrasound revealed a 1.7 x 0.9  x 1.5 cm lymph node.      She underwent a left breast core biopsy on 2023 which revealed invasive ductal carcinoma, grade 3.  Estrogen receptors were negative.  Progesterone receptors stained positive at 5 to 10%.  HER2/jose was 1+ IHC.  Lymph node biopsy  revealed adenocarcinoma.      She underwent a PET scan on 2023 which revealed a left breast mass and a left axillary lymph node with no evidence of metastatic disease.      On 2023 she underwent a left partial mastectomy with  sentinel lymph node biopsy.  Pathology revealed a 2.1 cm invasive ductal carcinoma, grade 3.  The tumor was indeterminant for lymphovascular invasion.  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  There was no element of extensive intraductal component.   The resection margins were negative.  4 sentinel lymph nodes were removed, 1 of which contained metastatic disease measuring 1.6 cm with extranodal extension and tumor deposits in the perinodal adipose tissue.      She saw Dr. Bates and given her other  comorbidities has decided not to proceed with chemotherapy    9/12/23 -10/16/23: Radiation therapy to the left breast and comprehensive lymph nodes consisting of a dose of 61.25 Gy given in 25 fractions of 2.45 Gy per fraction.  ABC used for cardiac sparing.     SUBJECTIVE  History of Present Illness:   Allyson Granados is here with her dtr today for routine radiation follow up/surveillance visit.  She is doing well and reports breast has healed well. Mammogram/US in March left breast revealed benign fat necrosis at the lumpectomy site in the left breast corresponding to palpable area of clinical concern. She endorses low energy and has recently been diagnosed with a right eye ocular lesion.  Getting PET scan and MRI next week.  She also was recently in the ED for epistaxis.  Continues on Eliquis for Afib. She lives with her son and tries to be active.   Denies headaches, fever, chills, cough, SOB, chest pain, N/V. She has chronic back pain and is following with pain management.  Uses Rolator walker for stability walking.     Review of Systems:    Review of Systems   All other systems reviewed and are negative.    Performance Status:   The Karnofsky performance scale today is 80, Normal activity with effort; some signs or symptoms of disease (ECOG equivalent 1).      OBJECTIVE    Current Outpatient Medications:     albuterol 90 mcg/actuation inhaler, Inhale 2 puffs every 4 hours if  needed., Disp: , Rfl:     amiodarone (Pacerone) 200 mg tablet, Take 1 tablet (200 mg) by mouth once every day., Disp: 90 tablet, Rfl: 2    apixaban (Eliquis) 5 mg tablet, Take 1 tablet (5 mg) by mouth 2 times a day., Disp: 180 tablet, Rfl: 3    diclofenac sodium 1 % kit, Apply 2 inches to each knee and rub until gone. three times a day, Disp: , Rfl:     furosemide (Lasix) 20 mg tablet, Take 1 tablet (20 mg) by mouth once daily., Disp: 90 tablet, Rfl: 3    HYDROcodone-acetaminophen (Norco)  mg tablet, Take 1 tablet by mouth 3 times a day for 28 days. Do not fill before June 14, 2024., Disp: 84 tablet, Rfl: 0    levothyroxine (Synthroid, Levoxyl) 50 mcg tablet, Take 1 tablet (50 mcg) by mouth once daily., Disp: 90 tablet, Rfl: 1    losartan (Cozaar) 50 mg tablet, Take 1 tablet (50 mg) by mouth 2 times a day., Disp: 180 tablet, Rfl: 3    metoprolol succinate XL (Toprol-XL) 50 mg 24 hr tablet, Take 1 tablet (50 mg) by mouth once daily., Disp: 90 tablet, Rfl: 2    naloxone (Narcan) 4 mg/0.1 mL nasal spray, Administer into affected nostril(s). Spray one bottle into nostril while patient lay on their back, repeat if needed, Disp: , Rfl:     nystatin (Mycostatin) 100,000 unit/gram powder, APPLY TOPICALLY IF NEEDED FOR RASH OR ITCHING., Disp: 60 g, Rfl: 0    potassium chloride CR 10 mEq ER tablet, Take 1 tablet (10 mEq) by mouth once daily. Do not crush, chew, or split., Disp: 90 tablet, Rfl: 3    pravastatin (Pravachol) 40 mg tablet, TAKE 1 TABLET BY MOUTH EVERY DAY, Disp: 90 tablet, Rfl: 0    rOPINIRole (Requip) 4 mg tablet, Take 1 tablet (4 mg) by mouth 2 times a day., Disp: 180 tablet, Rfl: 3     Physical Exam  Constitutional:       Appearance: Normal appearance.   HENT:      Head: Normocephalic and atraumatic.      Nose: Nose normal.      Mouth/Throat:      Mouth: Mucous membranes are moist.      Pharynx: Oropharynx is clear.   Eyes:      Conjunctiva/sclera: Conjunctivae normal.      Pupils: Pupils are equal,  round, and reactive to light.   Cardiovascular:      Rate and Rhythm: Normal rate.      Heart sounds: Normal heart sounds.   Pulmonary:      Effort: Pulmonary effort is normal.      Breath sounds: Normal breath sounds.   Chest:   Breasts:     Right: Normal. No inverted nipple, mass or nipple discharge.      Left: Normal. No swelling, inverted nipple, mass or nipple discharge.          Comments: Left breast surgical incision well healed. Thickening around incision consistent with post treatment changes and noted fat necrosis on recent US. Residual tanning in radiation field.   Abdominal:      Palpations: Abdomen is soft.   Musculoskeletal:         General: Normal range of motion.      Cervical back: Normal range of motion.   Lymphadenopathy:      Cervical: No cervical adenopathy.      Upper Body:      Right upper body: No supraclavicular or axillary adenopathy.      Left upper body: No supraclavicular or axillary adenopathy.   Skin:     General: Skin is warm and dry.   Neurological:      General: No focal deficit present.      Mental Status: She is alert and oriented to person, place, and time.   Psychiatric:         Mood and Affect: Mood normal.         Behavior: Behavior normal.         ASSESSMENT/PLAN:  84 y.o. female with stage IIIA left breast cancer, multiple medical co-morbidities, s/p breast conserving surgery followed by radiation.  She seems to be recovering well and breast is healing well.  Reviewed skin care, possible late effects of treatment and follow up.  She will follow up with Dr. Bates's team in med onc next week after scans, and with Dr. Felton for mammograms.  Radiation follow up in 6 mo.  Call with any questions/concerns.     Emy Anthony CNP  [phone]

## 2024-06-23 DIAGNOSIS — B37.89 CANDIDIASIS OF BREAST: ICD-10-CM

## 2024-06-24 RX ORDER — NYSTATIN 100000 [USP'U]/G
POWDER TOPICAL AS NEEDED
Qty: 60 G | Refills: 0 | Status: SHIPPED | OUTPATIENT
Start: 2024-06-24

## 2024-06-27 ENCOUNTER — HOSPITAL ENCOUNTER (OUTPATIENT)
Dept: RADIOLOGY | Facility: CLINIC | Age: 84
Discharge: HOME | End: 2024-06-27
Payer: MEDICARE

## 2024-06-27 ENCOUNTER — TELEPHONE (OUTPATIENT)
Dept: HEMATOLOGY/ONCOLOGY | Facility: HOSPITAL | Age: 84
End: 2024-06-27
Payer: MEDICARE

## 2024-06-27 DIAGNOSIS — C79.70 CARCINOMA OF BREAST METASTATIC TO ADRENAL GLAND, UNSPECIFIED LATERALITY (MULTI): ICD-10-CM

## 2024-06-27 DIAGNOSIS — C50.919 CARCINOMA OF BREAST METASTATIC TO ADRENAL GLAND, UNSPECIFIED LATERALITY (MULTI): ICD-10-CM

## 2024-06-27 PROCEDURE — 78815 PET IMAGE W/CT SKULL-THIGH: CPT | Mod: PS

## 2024-06-27 PROCEDURE — A9552 F18 FDG: HCPCS | Performed by: INTERNAL MEDICINE

## 2024-06-27 PROCEDURE — 2550000001 HC RX 255 CONTRASTS: Performed by: INTERNAL MEDICINE

## 2024-06-27 PROCEDURE — 70553 MRI BRAIN STEM W/O & W/DYE: CPT

## 2024-06-27 PROCEDURE — 3430000001 HC RX 343 DIAGNOSTIC RADIOPHARMACEUTICALS: Performed by: INTERNAL MEDICINE

## 2024-06-27 PROCEDURE — A9575 INJ GADOTERATE MEGLUMI 0.1ML: HCPCS | Performed by: INTERNAL MEDICINE

## 2024-06-27 RX ORDER — FLUDEOXYGLUCOSE F 18 200 MCI/ML
14.92 INJECTION, SOLUTION INTRAVENOUS
Status: COMPLETED | OUTPATIENT
Start: 2024-06-27 | End: 2024-06-27

## 2024-06-27 RX ORDER — GADOTERATE MEGLUMINE 376.9 MG/ML
17 INJECTION INTRAVENOUS
Status: COMPLETED | OUTPATIENT
Start: 2024-06-27 | End: 2024-06-27

## 2024-06-27 NOTE — TELEPHONE ENCOUNTER
Called patient and discussed with her daughter who is primary caregiver. We discussed brain MRI results which show unfortunately brain mets and progression outside brain on Pet/CT. She is symptomatic with increasing headaches and hip pain. Although she isn't a candidate for systemic therapy I would recommend considering palliative radiotherapy. Dr Nicole has been kind enough to see her next Wednesday at 10:00am to discuss palliative XRT.

## 2024-07-03 ENCOUNTER — HOSPITAL ENCOUNTER (OUTPATIENT)
Dept: RADIATION ONCOLOGY | Facility: CLINIC | Age: 84
Setting detail: RADIATION/ONCOLOGY SERIES
Discharge: HOME | End: 2024-07-03
Payer: MEDICARE

## 2024-07-03 ENCOUNTER — OFFICE VISIT (OUTPATIENT)
Dept: HEMATOLOGY/ONCOLOGY | Facility: CLINIC | Age: 84
End: 2024-07-03
Payer: MEDICARE

## 2024-07-03 VITALS
BODY MASS INDEX: 27.98 KG/M2 | SYSTOLIC BLOOD PRESSURE: 181 MMHG | HEART RATE: 67 BPM | DIASTOLIC BLOOD PRESSURE: 77 MMHG | WEIGHT: 163 LBS

## 2024-07-03 VITALS
TEMPERATURE: 97.2 F | SYSTOLIC BLOOD PRESSURE: 179 MMHG | HEIGHT: 64 IN | BODY MASS INDEX: 28.01 KG/M2 | HEART RATE: 74 BPM | RESPIRATION RATE: 18 BRPM | OXYGEN SATURATION: 92 % | WEIGHT: 164.1 LBS | DIASTOLIC BLOOD PRESSURE: 71 MMHG

## 2024-07-03 DIAGNOSIS — C50.012 MALIGNANT NEOPLASM INVOLVING BOTH NIPPLE AND AREOLA IN BOTH BREASTS IN FEMALE, ESTROGEN RECEPTOR NEGATIVE (MULTI): Primary | ICD-10-CM

## 2024-07-03 DIAGNOSIS — C79.51 MALIGNANT NEOPLASM METASTATIC TO BONE (MULTI): ICD-10-CM

## 2024-07-03 DIAGNOSIS — C78.7 METASTASES TO THE LIVER (MULTI): ICD-10-CM

## 2024-07-03 DIAGNOSIS — C50.919 CARCINOMA OF BREAST METASTATIC TO ADRENAL GLAND, UNSPECIFIED LATERALITY (MULTI): ICD-10-CM

## 2024-07-03 DIAGNOSIS — C79.70 CARCINOMA OF BREAST METASTATIC TO ADRENAL GLAND, UNSPECIFIED LATERALITY (MULTI): ICD-10-CM

## 2024-07-03 DIAGNOSIS — C50.919 MALIGNANT NEOPLASM OF BREAST METASTATIC TO BRAIN, UNSPECIFIED LATERALITY (MULTI): ICD-10-CM

## 2024-07-03 DIAGNOSIS — C50.011 MALIGNANT NEOPLASM INVOLVING BOTH NIPPLE AND AREOLA IN BOTH BREASTS IN FEMALE, ESTROGEN RECEPTOR NEGATIVE (MULTI): Primary | ICD-10-CM

## 2024-07-03 DIAGNOSIS — C79.31 MALIGNANT NEOPLASM OF BREAST METASTATIC TO BRAIN, UNSPECIFIED LATERALITY (MULTI): ICD-10-CM

## 2024-07-03 DIAGNOSIS — N18.31 STAGE 3A CHRONIC KIDNEY DISEASE (MULTI): Primary | ICD-10-CM

## 2024-07-03 DIAGNOSIS — Z17.1 MALIGNANT NEOPLASM INVOLVING BOTH NIPPLE AND AREOLA IN BOTH BREASTS IN FEMALE, ESTROGEN RECEPTOR NEGATIVE (MULTI): Primary | ICD-10-CM

## 2024-07-03 PROCEDURE — 3077F SYST BP >= 140 MM HG: CPT | Performed by: INTERNAL MEDICINE

## 2024-07-03 PROCEDURE — 1160F RVW MEDS BY RX/DR IN RCRD: CPT | Performed by: INTERNAL MEDICINE

## 2024-07-03 PROCEDURE — 3078F DIAST BP <80 MM HG: CPT | Performed by: INTERNAL MEDICINE

## 2024-07-03 PROCEDURE — 99215 OFFICE O/P EST HI 40 MIN: CPT | Performed by: RADIOLOGY

## 2024-07-03 PROCEDURE — 1157F ADVNC CARE PLAN IN RCRD: CPT | Performed by: INTERNAL MEDICINE

## 2024-07-03 PROCEDURE — 1036F TOBACCO NON-USER: CPT | Performed by: INTERNAL MEDICINE

## 2024-07-03 PROCEDURE — 1125F AMNT PAIN NOTED PAIN PRSNT: CPT | Performed by: INTERNAL MEDICINE

## 2024-07-03 PROCEDURE — 99214 OFFICE O/P EST MOD 30 MIN: CPT | Performed by: INTERNAL MEDICINE

## 2024-07-03 PROCEDURE — 1159F MED LIST DOCD IN RCRD: CPT | Performed by: INTERNAL MEDICINE

## 2024-07-03 RX ORDER — DILTIAZEM HYDROCHLORIDE 120 MG/1
1 CAPSULE, COATED, EXTENDED RELEASE ORAL
COMMUNITY
Start: 2024-06-30

## 2024-07-03 ASSESSMENT — ENCOUNTER SYMPTOMS
EYE PROBLEMS: 1
CARDIOVASCULAR NEGATIVE: 1
BACK PAIN: 1
HEADACHES: 1
APPETITE CHANGE: 0
WHEEZING: 0
PSYCHIATRIC NEGATIVE: 1
HEMATURIA: 0
ABDOMINAL DISTENTION: 0
BLOOD IN STOOL: 0
CONSTIPATION: 0
EYES NEGATIVE: 1
CHILLS: 0
SHORTNESS OF BREATH: 0
SLEEP DISTURBANCE: 0
EXTREMITY WEAKNESS: 0
RESPIRATORY NEGATIVE: 1
HEADACHES: 1
LEG SWELLING: 0
COUGH: 0
MYALGIAS: 0
DIAPHORESIS: 0
UNEXPECTED WEIGHT CHANGE: 1
HOT FLASHES: 0
NERVOUS/ANXIOUS: 1
DYSURIA: 0
DIFFICULTY URINATING: 0
FATIGUE: 1
PALPITATIONS: 0
SCLERAL ICTERUS: 0
NUMBNESS: 0
TROUBLE SWALLOWING: 1
FLANK PAIN: 1
DEPRESSION: 0
CONFUSION: 0
DIZZINESS: 0
ARTHRALGIAS: 0
FREQUENCY: 0
HEMATOLOGIC/LYMPHATIC NEGATIVE: 1
RESPIRATORY NEGATIVE: 1
APPETITE CHANGE: 1
HEMOPTYSIS: 0
LIGHT-HEADEDNESS: 1
SEIZURES: 0
EYE PROBLEMS: 0
FATIGUE: 0
DIZZINESS: 1
CONSTIPATION: 1
FEVER: 0
DIARRHEA: 0
NAUSEA: 0
CHEST TIGHTNESS: 0
ABDOMINAL PAIN: 0
ADENOPATHY: 0
BRUISES/BLEEDS EASILY: 0

## 2024-07-03 ASSESSMENT — PAIN SCALES - GENERAL: PAINLEVEL: 7

## 2024-07-03 NOTE — PROGRESS NOTES
Breast Medical Oncology Clinic  Location: [facility]      BREAST CANCER DIAGNOSIS  Metastatic TNBC with brain, bone, liver mets.  Malignant neoplasm of breast (Multi), Pathologic: Stage IIIA (pT2, pN1a, cM0, G3, ER-, [place]-, HER2-)       ONCOLOGIC HISTORY   s/p left partial mastectomy 1/19/23. Had 1/4 involved nodes and an ~2cm tumor with negative margins. Blueprint came back as basal subtype.  Declined adjuvant capecitabine with my colleague Dr Bates. Due to CT scans for radiation planning axillary adenopathy was identified. She then underwent ax dissection on 7/24/23 which revealed 13/23 nodes.      9/12/23 -10/16/23: Radiation therapy with Dr Odessa Nicole to the left breast and comprehensive lymph nodes consisting of a dose of 61.25 Gy given in 25 fractions of 2.45 Gy per fraction. ABC used for cardiac sparing.     CURRENT THERAPY  Conservative management    HISTORY OF PRESENT ILLNESS    Allyson Granados is a 84 y.o. woman with multiple comorbidities. Here with family to discuss PET/CT and brain MRI results which unfortunately show widely metastatic disease. Having headaches and severe left sided back pain. Scheduled to see Dr Nicole later today. PS is poor ECOG 3.            Review of Systems   Constitutional:  Positive for unexpected weight change (wt loss). Negative for appetite change, chills, diaphoresis, fatigue and fever.   HENT:  Negative.  Negative for hearing loss and lump/mass.    Eyes: Negative.  Negative for eye problems and icterus.   Respiratory: Negative.  Negative for chest tightness, cough, hemoptysis, shortness of breath and wheezing.    Cardiovascular: Negative.  Negative for chest pain, leg swelling and palpitations.   Gastrointestinal:  Negative for abdominal distention, abdominal pain, blood in stool, constipation, diarrhea and nausea.   Endocrine: Negative for hot flashes.   Genitourinary:  Negative for bladder incontinence, difficulty urinating, dyspareunia, dysuria, frequency and hematuria.     Musculoskeletal:  Positive for back pain, flank pain and gait problem. Negative for arthralgias and myalgias.   Neurological:  Positive for gait problem and headaches. Negative for dizziness, extremity weakness, numbness and seizures.   Hematological:  Negative for adenopathy. Does not bruise/bleed easily.   Psychiatric/Behavioral:  Negative for confusion, depression and sleep disturbance. The patient is nervous/anxious.    All other systems reviewed and are negative.        Past Medical History:  has a past medical history of Breast cancer (Multi), Personal history of irradiation, Personal history of other diseases of the circulatory system, Personal history of other diseases of the musculoskeletal system and connective tissue, Personal history of other diseases of urinary system, Personal history of other endocrine, nutritional and metabolic disease, Unspecified asthma, uncomplicated ([facility]) (12/21/2013), and Unspecified dementia, unspecified severity, without behavioral disturbance, psychotic disturbance, mood disturbance, and anxiety (Multi) (12/02/2020).  Surgical History:   has a past surgical history that includes Breast lumpectomy (Left, 01/19/2023) and Hysterectomy.  Social History:   reports that she has never smoked. She has never used smokeless tobacco. She reports that she does not drink alcohol and does not use drugs.  Family History:    Family History   Problem Relation Name Age of Onset    Breast cancer Sister  60     Family Oncology History:  Cancer-related family history includes Breast cancer (age of onset: 60) in her sister.      OBJECTIVE    VS / Pain:  BP (!) 181/77 (BP Location: Left arm, Patient Position: Sitting, BP Cuff Size: Adult)   Pulse 67   Wt 73.9 kg (163 lb)   BMI 27.98 kg/m²   BSA: 1.83 meters squared   Pain Scale: 8    Performance Status:  The ECOG performance scale today is ECOG: 3- Capable of only limited selfcare, confined to bed/chair > 50% of waking hrs.    Physical  Exam  Constitutional:       General: She is not in acute distress.     Appearance: She is not ill-appearing, toxic-appearing or diaphoretic.   HENT:      Nose: No congestion or rhinorrhea.      Mouth/Throat:      Pharynx: No posterior oropharyngeal erythema.   Cardiovascular:      Rate and Rhythm: Normal rate and regular rhythm.      Pulses: Normal pulses.      Heart sounds: Normal heart sounds. No murmur heard.     No gallop.   Pulmonary:      Breath sounds: Normal breath sounds.   Abdominal:      General: There is no distension.      Palpations: There is no mass.      Tenderness: There is no abdominal tenderness.   Musculoskeletal:         General: No swelling.      Cervical back: No rigidity.      Right lower leg: No edema.      Left lower leg: No edema.   Lymphadenopathy:      Cervical: No cervical adenopathy.   Skin:     General: Skin is warm.      Coloration: Skin is not cyanotic.      Findings: No bruising, ecchymosis or erythema.   Neurological:      General: No focal deficit present.      Mental Status: She is oriented to person, place, and time.      Cranial Nerves: Cranial nerves 2-12 are intact.      Motor: Motor function is intact.   Psychiatric:         Attention and Perception: Attention and perception normal.         Mood and Affect: Mood and affect normal.         Behavior: Behavior normal.         Thought Content: Thought content normal.         Judgment: Judgment normal.           Diagnostic Results   === 08/22/23 ===    CT RADIATION THERAPY PLANNING   === 06/27/24 ===    MR BRAIN W AND WO CONTRAST    - Impression -  Multiple intracranial enhancing lesions compatible with metastatic  disease. There are multiple small nodular foci of enhancement along  the folia of the cerebellum bilaterally which raises the possibility  of leptomeningeal disease. There is mild surrounding vasogenic edema.  There is no significant associated mass effect. There is no midline  shift.    There is an enhancing lesion  within the posterior globe on the right  which also may represent a metastatic lesion.    MACRO:  None    === 06/27/24 ===    NM PET CT SKULL BASE TO MID THIGH    - Impression -  1. Interval worsening of metastatic breast cancer, as evidence by FDG  avid right globe lesion, new FDG avid pulmonary nodules, mediastinal  lymphadenopathy, liver lesions, and diffuse osseous metastatic  disease throughout the axial and appendicular skeleton, when compared  to prior PET from 08/24/2023.    I personally reviewed the image(s) / study and agree with the  findings and interpretation as stated. This study was interpreted at  [facility].    MACRO:  None    Signed by: Mackenzie Mckeon 6/29/2024 10:48 PM  Dictation workstation:   [id number]   LABORATORY/PATHOLOGY DATA    Lab on 06/13/2024   Component Date Value Ref Range Status    Creatinine, Urine Random 06/13/2024 59.9  20.0 - 320.0 mg/dL Final    Amphetamine Screen, Urine 06/13/2024 Presumptive Negative  Presumptive Negative Final    Barbiturate Screen, Urine 06/13/2024 Presumptive Negative  Presumptive Negative Final    Cannabinoid Screen, Urine 06/13/2024 Presumptive Negative  Presumptive Negative Final    Cocaine Metabolite Screen, Urine 06/13/2024 Presumptive Negative  Presumptive Negative Final    PCP Screen, Urine 06/13/2024 Presumptive Negative  Presumptive Negative Final    Clonazepam 06/13/2024 <25  <25 ng/mL Final    7-Aminoclonazepam 06/13/2024 <25  <25 ng/mL Final    Alprazolam 06/13/2024 <25  <25 ng/mL Final    Alpha-Hydroxyalprazolam 06/13/2024 <25  <25 ng/mL Final    Midazolam 06/13/2024 <25  <25 ng/mL Final    Alpha-Hydroxymidazolam 06/13/2024 <25  <25 ng/mL Final    Chlordiazepoxide 06/13/2024 <25  <25 ng/mL Final    Diazepam 06/13/2024 <25  <25 ng/mL Final    Nordiazepam 06/13/2024 <25  <25 ng/mL Final    Temazepam 06/13/2024 <25  <25 ng/mL Final    Oxazepam 06/13/2024 <25  <25 ng/mL Final    Lorazepam 06/13/2024 <25  <25 ng/mL  "Final    Methadone 06/13/2024 <25  <25 ng/mL Final    EDDP 06/13/2024 <25  <25 ng/mL Final    6-Acetylmorphine 06/13/2024 <25  <25 ng/mL Final    Codeine 06/13/2024 <50  <50 ng/mL Final    Hydrocodone 06/13/2024 1,909 (H)  <25 ng/mL Final    Hydromorphone 06/13/2024 290 (H)  <25 ng/mL Final    Morphine  06/13/2024 <50  <50 ng/mL Final    Norhydrocodone 06/13/2024 746 (H)  <25 ng/mL Final    Noroxycodone 06/13/2024 <25  <25 ng/mL Final    Oxycodone 06/13/2024 <25  <25 ng/mL Final    Oxymorphone 06/13/2024 <25  <25 ng/mL Final    Fentanyl 06/13/2024 <2.5  <2.5 ng/mL Final    Norfentanyl 06/13/2024 <2.5  <2.5 ng/mL Final    Tramadol 06/13/2024 <50  <50 ng/mL Final    O-Desmethyltramadol 06/13/2024 <50  <50 ng/mL Final    Zolpidem 06/13/2024 <25  <25 ng/mL Final    Zolpidem Metabolite (ZCA) 06/13/2024 <25  <25 ng/mL Final      No results found for: \"LABCA2\"          IMPRESSION/PLAN    Progressive metastatic TNBC.  Based on patient's preferences and poor ECOG PS, the best option for her at this point would be palliation of bone pain with radiation and whole brain XRT. Then once complete will transition to home hospice. Patient and family are in agreement. I will not make a formal appt with me and will be here to assist in anyway that I can.    2. Pain neoplasm related.  Patient seeing pain management and has dilaudid that she is taking q3-4 hrs PRN pain. She doesn't want to change her regimen at this point. She is talking with them next wk.      We discussed the clinical significance of diagnosis, goals of care and treatment plan in detail.     I personally spent over half of a total 45 minutes face to face with the patient in counseling and discussion and/or coordination of care as described above.         -------------------------------------------------------------------------------------------------------------------------------  Byo Bates MD  Director of Breast Cancer Medical Oncology Research Program "   [facility]  Professor of Medicine  [facility]  [address], [address]  [place] [id number]  Phone: [phone]  [email or web address]

## 2024-07-03 NOTE — PROGRESS NOTES
Radiation Oncology Nursing Note    Prior Radiotherapy:  Yes, describe: 2023 Left breast CNI 25fx 6125 cGy    Current Systemic Treatment:  No     Presence of Pacemaker or ICD:  No    History of Autoimmune or Connective Tissue Disorders:  No    Pain: The patient's current pain level was assessed.  They report currently having a pain of 7 out of 10.  They feel their pain is under control with the use of pain medications.    Review of Systems:  Review of Systems   Constitutional:  Positive for appetite change (decreased) and fatigue.   HENT:   Positive for trouble swallowing.    Eyes:  Positive for eye problems (right eye vision loss).   Respiratory: Negative.     Gastrointestinal:  Positive for constipation.   Genitourinary: Negative.     Musculoskeletal:         Multiple sites of bone pain   Skin:         Basal cell carcinoma on forehead and scalp   Neurological:  Positive for dizziness, headaches and light-headedness.   Hematological: Negative.    Psychiatric/Behavioral: Negative.

## 2024-07-03 NOTE — PROGRESS NOTES
Radiation Oncology Follow-Up    Patient Name:  Allyson Granados  MRN:  [id number]  :  1940    Referring Provider: Jolene Anthony APR*  Primary Care Provider: KELLI Maciel  Care Team: Patient Care Team:  KELLI Maciel as PCP - General (Family Medicine)  Jaun England MD as Surgeon (Pain Medicine)  Chung FOWLER MD as Cardiologist (Cardiology)  KELLI Gaspar as Consulting Physician (Radiation Oncology)  Milly Mendoza MD as Surgeon (Pulmonary Disease)  Odessa Nicole MD as Radiation Oncologist (Radiation Oncology)  Ana Paula Ackerman MD as Consulting Physician (Nephrology)  Boy Bates MD as Consulting Physician (Hematology and Oncology)    Date of Service: 7/3/2024    SUBJECTIVE  History of Present Illness:  Allyson Granados is a 84 y.o. female who was previously seen at the [facility] Department of Radiation Oncology for left breast cancer.    Ms. Granados is well-known to me.  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  She was found to have enlarged lymph nodes on her radiation therapy planning scan and underwent a completion axillary lymph node dissection which revealed 13 of 23 positive lymph nodes.  She returned for adjuvant radiation and was again found to have enlarged lymph nodes on her radiation planning scan.  It was elected to proceed with radiation.  She received a dose of 50 Gray/25 fractions with a simultaneous integrated boost to the enlarged lymph nodes consisting of 61.25 Gray/25 fractions.  Her radiation was completed 10/6/2023.  She has been having some ocular issues consisting of blurry vision in her right eye which started approximately 2 weeks ago and has undergone a workup.  PET scan performed 2024 revealed mild FDG avidity in the posterior aspect of the right globe.  The previously  "seen FDG avid axillary and supraclavicular lymph nodes were no longer visualized.  There were multiple new lung nodules, mediastinal lymph nodes, liver lesions and bony metastasis.  MRI of the brain performed 6/27/2024 revealed multiple intracranial subcentimeter lesions with a question of leptomeningeal disease.  She had a 1.5 x 0.4 cm enhancing lesion in the right globe.   I was asked to see Ms. Granados by Dr. Boy Bates for consideration of radiation to the brain and orbit.    With respect to her current symptoms, she has left hip pain which she rates as a 6-7 out of 10 with pain meds on board.  This is interfering with her sleeping.  She has mild pain in the right hip but nothing very bothersome. She has noted some forgetfulness.  She has some difficulty swallowing large pieces of meat although is able to if she cuts them into smaller pieces.    Treatment Rendered:   Simulation: S    Treatment Period Technique Fraction Dose Fractions Total Dose   Course 1 9/12/2023-10/16/2023  (days elapsed: 34)         Left breast CNI 9/12/2023-10/16/2023 VMAT 245 / 245 cGy 25 / 25 6,125 / 6,125 cGy       Review of Systems:   Review of Systems - Oncology.  See nursing note for review of systems.    Performance Status:   The Karnofsky performance scale today is 70, Cares for self; unable to carry on normal activity or to do active work (ECOG equivalent 1).       OBJECTIVE  Vital Signs:  /71 (BP Location: Right arm, Patient Position: Sitting, BP Cuff Size: Adult) Comment (BP Location): rt wrist  Pulse 74   Temp 36.2 °C (97.2 °F) (Temporal)   Resp 18   Ht 1.626 m (5' 4.02\")   Wt 74.4 kg (164 lb 1.6 oz)   SpO2 92%   BMI 28.15 kg/m²   Physical Exam  Neurological:      Comments: Extraocular muscles are intact.  There is deviation of the tongue to the left with some mild left facial drooping.  The rest of her cranial nerves are intact.  Motor strength is 5 out of 5 throughout.  Sensation is intact.  Her gait is " unsteady and she ambulates with a cane.            ASSESSMENT:   Allyson Granados is a 84 y.o. female with Malignant neoplasm of breast (Multi), Pathologic: Stage IIIA (pT2, pN1a, cM0, G3, ER-, [place]-, HER2-).  She now has widely metastatic disease with pain in her left hip and symptoms related to her brain metastases.    PLAN: I had a long conversation with the patient, her children and grandson detailing the goals of palliative radiation.  She is having headaches as well as blurry vision as well as significant pain in her left hip.  The side effects of radiation discussed included but were not limited to skin irritation with possible breakdown, fatigue, bowel issues, alopecia, hearing difficulty, worsening headaches, nausea, seizures, stroke and memory loss.  She voices understanding and does wish to proceed with radiation to both the brain and hip.  I have given her a simulation time for later this week and we will plan on starting her early next week.  She does plan to transition to hospice following radiation.    NCCN Guidelines were applicable to guide this patients treatment plan.  Odessa Nicole MD

## 2024-07-05 ENCOUNTER — HOSPITAL ENCOUNTER (OUTPATIENT)
Dept: RADIOLOGY | Facility: CLINIC | Age: 84
Discharge: HOME | End: 2024-07-05
Payer: MEDICARE

## 2024-07-05 DIAGNOSIS — Z17.1 MALIGNANT NEOPLASM INVOLVING BOTH NIPPLE AND AREOLA IN BOTH BREASTS IN FEMALE, ESTROGEN RECEPTOR NEGATIVE (MULTI): ICD-10-CM

## 2024-07-05 DIAGNOSIS — C50.012 MALIGNANT NEOPLASM INVOLVING BOTH NIPPLE AND AREOLA IN BOTH BREASTS IN FEMALE, ESTROGEN RECEPTOR NEGATIVE (MULTI): ICD-10-CM

## 2024-07-05 DIAGNOSIS — C50.011 MALIGNANT NEOPLASM INVOLVING BOTH NIPPLE AND AREOLA IN BOTH BREASTS IN FEMALE, ESTROGEN RECEPTOR NEGATIVE (MULTI): ICD-10-CM

## 2024-07-06 DIAGNOSIS — B37.89 CANDIDIASIS OF BREAST: ICD-10-CM

## 2024-07-08 ENCOUNTER — TELEPHONE (OUTPATIENT)
Dept: ADMISSION | Facility: HOSPITAL | Age: 84
End: 2024-07-08
Payer: MEDICARE

## 2024-07-08 ENCOUNTER — TELEPHONE (OUTPATIENT)
Dept: PAIN MEDICINE | Facility: CLINIC | Age: 84
End: 2024-07-08

## 2024-07-08 DIAGNOSIS — C50.919 MALIGNANT NEOPLASM OF BREAST METASTATIC TO BRAIN, UNSPECIFIED LATERALITY (MULTI): ICD-10-CM

## 2024-07-08 DIAGNOSIS — C79.31 MALIGNANT NEOPLASM OF BREAST METASTATIC TO BRAIN, UNSPECIFIED LATERALITY (MULTI): ICD-10-CM

## 2024-07-08 RX ORDER — NYSTATIN TOPICAL POWDER 100000 U/G
POWDER TOPICAL
Qty: 60 G | Refills: 0 | Status: SHIPPED | OUTPATIENT
Start: 2024-07-08

## 2024-07-08 NOTE — TELEPHONE ENCOUNTER
Pt's dtr is requesting that oncology team manage pt's ongoing bone /joint pain issues.   Pt's dtr did speak with pain management Dr England's team and they advised they are ok if oncology manages ongoing pain issues.   Pt currently taking Norco 10mg/325mg. 1 tablet TID. Dtr states this is not effectively managing pain at night.   Pt will begin rad therapy on Wed 7/10.   Last FUV 7/3 with next planned FUV 9/11.   Dtr also states that codeine and morphine cause nausea for pt.

## 2024-07-08 NOTE — TELEPHONE ENCOUNTER
Pt's dtr updated per CIARA Hdez CNP- pt will see sup onc on Weds. Dtr feels that pt will be ok with out changing pain meds until she is seen on Weds.   Nothing further needed at this time.

## 2024-07-10 ENCOUNTER — OFFICE VISIT (OUTPATIENT)
Dept: PALLIATIVE MEDICINE | Facility: CLINIC | Age: 84
End: 2024-07-10
Payer: MEDICARE

## 2024-07-10 ENCOUNTER — HOSPITAL ENCOUNTER (OUTPATIENT)
Dept: RADIATION ONCOLOGY | Facility: CLINIC | Age: 84
Setting detail: RADIATION/ONCOLOGY SERIES
Discharge: HOME | End: 2024-07-10
Payer: MEDICARE

## 2024-07-10 ENCOUNTER — TELEPHONE (OUTPATIENT)
Dept: PALLIATIVE MEDICINE | Facility: HOSPITAL | Age: 84
End: 2024-07-10

## 2024-07-10 VITALS
RESPIRATION RATE: 16 BRPM | DIASTOLIC BLOOD PRESSURE: 75 MMHG | TEMPERATURE: 97.3 F | SYSTOLIC BLOOD PRESSURE: 132 MMHG | BODY MASS INDEX: 27.65 KG/M2 | HEART RATE: 68 BPM | WEIGHT: 161.16 LBS | OXYGEN SATURATION: 91 %

## 2024-07-10 DIAGNOSIS — Z51.5 PALLIATIVE CARE ENCOUNTER: ICD-10-CM

## 2024-07-10 DIAGNOSIS — G89.3 CANCER ASSOCIATED PAIN: Primary | ICD-10-CM

## 2024-07-10 DIAGNOSIS — K59.00 CONSTIPATION, UNSPECIFIED CONSTIPATION TYPE: ICD-10-CM

## 2024-07-10 DIAGNOSIS — C79.31 MALIGNANT NEOPLASM OF BREAST METASTATIC TO BRAIN, UNSPECIFIED LATERALITY (MULTI): ICD-10-CM

## 2024-07-10 DIAGNOSIS — R11.0 NAUSEA: ICD-10-CM

## 2024-07-10 DIAGNOSIS — C50.919 MALIGNANT NEOPLASM OF BREAST METASTATIC TO BRAIN, UNSPECIFIED LATERALITY (MULTI): ICD-10-CM

## 2024-07-10 DIAGNOSIS — R68.2 DRY MOUTH: ICD-10-CM

## 2024-07-10 PROCEDURE — 77295 3-D RADIOTHERAPY PLAN: CPT | Performed by: RADIOLOGY

## 2024-07-10 PROCEDURE — 99214 OFFICE O/P EST MOD 30 MIN: CPT | Performed by: NURSE PRACTITIONER

## 2024-07-10 PROCEDURE — 77334 RADIATION TREATMENT AID(S): CPT | Performed by: RADIOLOGY

## 2024-07-10 PROCEDURE — 3075F SYST BP GE 130 - 139MM HG: CPT | Performed by: NURSE PRACTITIONER

## 2024-07-10 PROCEDURE — 77300 RADIATION THERAPY DOSE PLAN: CPT | Performed by: RADIOLOGY

## 2024-07-10 PROCEDURE — 1159F MED LIST DOCD IN RCRD: CPT | Performed by: NURSE PRACTITIONER

## 2024-07-10 PROCEDURE — 3078F DIAST BP <80 MM HG: CPT | Performed by: NURSE PRACTITIONER

## 2024-07-10 PROCEDURE — 1157F ADVNC CARE PLAN IN RCRD: CPT | Performed by: NURSE PRACTITIONER

## 2024-07-10 PROCEDURE — 99204 OFFICE O/P NEW MOD 45 MIN: CPT | Performed by: NURSE PRACTITIONER

## 2024-07-10 PROCEDURE — 1126F AMNT PAIN NOTED NONE PRSNT: CPT | Performed by: NURSE PRACTITIONER

## 2024-07-10 RX ORDER — DEXAMETHASONE 4 MG/1
4 TABLET ORAL DAILY
Qty: 15 TABLET | Refills: 0 | Status: SHIPPED | OUTPATIENT
Start: 2024-07-10 | End: 2024-07-25

## 2024-07-10 RX ORDER — ONDANSETRON 4 MG/1
4 TABLET, FILM COATED ORAL EVERY 8 HOURS PRN
Qty: 60 TABLET | Refills: 2 | Status: SHIPPED | OUTPATIENT
Start: 2024-07-10 | End: 2024-09-08

## 2024-07-10 RX ORDER — BISACODYL 5 MG
5-10 TABLET, DELAYED RELEASE (ENTERIC COATED) ORAL DAILY PRN
Qty: 60 TABLET | Refills: 2 | Status: SHIPPED | OUTPATIENT
Start: 2024-07-10 | End: 2024-10-08

## 2024-07-10 RX ORDER — PROCHLORPERAZINE MALEATE 10 MG
10 TABLET ORAL EVERY 6 HOURS PRN
Qty: 60 TABLET | Refills: 2 | Status: SHIPPED | OUTPATIENT
Start: 2024-07-10 | End: 2024-09-08

## 2024-07-10 RX ORDER — OMEPRAZOLE 20 MG/1
20 CAPSULE, DELAYED RELEASE ORAL
Qty: 30 CAPSULE | Refills: 11 | Status: SHIPPED | OUTPATIENT
Start: 2024-07-10 | End: 2025-07-10

## 2024-07-10 RX ORDER — OXYCODONE AND ACETAMINOPHEN 5; 325 MG/1; MG/1
1-1.5 TABLET ORAL EVERY 4 HOURS PRN
Qty: 90 TABLET | Refills: 0 | Status: SHIPPED | OUTPATIENT
Start: 2024-07-10 | End: 2024-07-20

## 2024-07-10 ASSESSMENT — ENCOUNTER SYMPTOMS
DEPRESSION: 0
LOSS OF SENSATION IN FEET: 0
OCCASIONAL FEELINGS OF UNSTEADINESS: 0

## 2024-07-10 ASSESSMENT — PAIN SCALES - GENERAL: PAINLEVEL: 0-NO PAIN

## 2024-07-10 NOTE — PROGRESS NOTES
SUPPORTIVE AND PALLIATIVE ONCOLOGY CONSULT - OUTPATIENT      SERVICE DATE: 7/10/2024    Referred by:  Boy Bates MD  Medical Oncologist: Boy Bates MD   Radiation Oncologist: Jolene Anthony, APRN-CNP  [place] Nicole MD  Primary Physician: Gini Orantes  [phone]    REASON FOR CONSULT/CHIEF CONSULT COMPLAINT: Introduction to Supportive and Palliative Oncology Services    Subjective   HISTORY OF PRESENT ILLNESS: Allyson Granados is a 84 y.o. female who presents with PMHx of progressive metastatic TNBC.      Onc hx for Dr. Bates's note  7/3/24  S/p left partial mastectomy 1/19/23. Had 1/4 involved nodes and an ~2cm tumor with negative margins.   Declined adjuvant capecitabine  CT scans for radiation planning revealed axillary adenopathy. She then underwent ax dissection on 7/24/23 which revealed 13/23 nodes.    9/12/23 -10/16/23: Radiation therapy  to the left breast and comprehensive lymph nodes consisting of a dose of 61.25 Gy given in 25 fractions of 2.45 Gy per fraction. ABC used for cardiac sparing. Recent PET/CT and brain MRI results which unfortunately show widely metastatic disease.  There is plan for palliative radiation the left hip and whole brain XRT then transition home with hospice.     Symptom Assessment:    Pain:very much     Severe - bilateral hips L>R - stabbed/aching - constant - worse with movement   R right bicep - possible tendon tear yesterday after reaching behind her and hearing a pop near her elbow    Numbness or tingling in hands/feet/other: none  Headache: a little  Lack of appetite: very much  Weight loss: very much  Pain in mouth/swallowing: none  Dry mouth: somewhat  Taste changes: somewhat metallic   Nausea/early satiety: somewhat  Vomiting: none  Constipation: none  Diarrhea: a little 3-4 loose stools in the morning that is chronic   Lack of energy: very much  Difficulty sleeping: a little  Anxiety: very much  Depression: very much  Shortness of breath:  none  Other:  mild dysphagia - soft foods     Information obtained from: chart review, interview of patient, and interview of family  ______________________________________________________________________     Oncology History   Malignant neoplasm of breast (Multi)   2/14/2023 Initial Diagnosis    Malignant neoplasm of breast (Multi)     3/1/2023 Cancer Staged    Staging form: Breast, AJCC 8th Edition, Pathologic stage from 3/1/2023: Stage IIIA (pT2, pN1a, cM0, G3, ER-, AK-, HER2-) - Signed by Boy Bates MD on 7/2/2024         Past Medical History:   Diagnosis Date    Breast cancer (Multi)     Personal history of irradiation     Personal history of other diseases of the circulatory system     History of hypertension    Personal history of other diseases of the musculoskeletal system and connective tissue     Personal history of arthritis    Personal history of other diseases of urinary system     History of kidney disease    Personal history of other endocrine, nutritional and metabolic disease     History of thyroid disease    Unspecified asthma, uncomplicated ([facility]) 12/21/2013    Asthma    Unspecified dementia, unspecified severity, without behavioral disturbance, psychotic disturbance, mood disturbance, and anxiety (Multi) 12/02/2020    Dementia, old age     Past Surgical History:   Procedure Laterality Date    BREAST LUMPECTOMY Left 01/19/2023    HYSTERECTOMY       Family History   Problem Relation Name Age of Onset    Breast cancer Sister  60        SOCIAL HISTORY  . 2 children.   Social History:  reports that she has never smoked. She has never used smokeless tobacco. She reports that she does not drink alcohol and does not use drugs.    REVIEW OF SYSTEMS  Review of systems negative unless noted in HPI.       Objective     Current Outpatient Medications   Medication Instructions    albuterol 90 mcg/actuation inhaler 2 puffs, inhalation, Every 4 hours PRN    amiodarone (PACERONE) 200 mg, oral,  Every Day    apixaban (ELIQUIS) 5 mg, oral, 2 times daily    diclofenac sodium 1 % kit Apply 2 inches to each knee and rub until gone. three times a day    dilTIAZem CD (Cardizem CD) 120 mg 24 hr capsule 1 capsule, oral, Daily (0630)    furosemide (LASIX) 20 mg, oral, Daily    HYDROcodone-acetaminophen (Norco)  mg tablet 1 tablet, oral, 3 times daily    Klayesta 100,000 unit/gram powder APPLY TOPICALLY IF NEEDED FOR RASH OR ITCHING.    levothyroxine (SYNTHROID, LEVOXYL) 50 mcg, oral, Daily    losartan (COZAAR) 50 mg, oral, 2 times daily    metoprolol succinate XL (TOPROL-XL) 50 mg, oral, Daily    naloxone (Narcan) 4 mg/0.1 mL nasal spray nasal, Spray one bottle into nostril while patient lay on their back, repeat if needed    potassium chloride CR 10 mEq ER tablet 10 mEq, oral, Daily, Do not crush, chew, or split.    pravastatin (PRAVACHOL) 40 mg, oral, Daily    rOPINIRole (REQUIP) 4 mg, oral, 2 times daily       Allergies:   Allergies   Allergen Reactions    Codeine Unknown and Nausea/vomiting    Diphenhydramine Other, Psychosis and Unknown     PSYCHOTIC    Diphenhydramine Hcl Unknown    Morphine Unknown, GI Upset and Nausea/vomiting             Creatinine   Date Value Ref Range Status   01/26/2024 1.15 (H) 0.50 - 1.05 mg/dL Final     AST   Date Value Ref Range Status   01/26/2024 15 9 - 39 U/L Final     ALT   Date Value Ref Range Status   01/26/2024 14 7 - 45 U/L Final     Comment:     Patients treated with Sulfasalazine may generate falsely decreased results for ALT.     Hemoglobin   Date Value Ref Range Status   07/19/2023 14.3 12.0 - 16.0 g/dL Final     Platelets   Date Value Ref Range Status   07/19/2023 311 150 - 450 x10E9/L Final          PHYSICAL EXAMINATION  Vital Signs:   Vital signs reviewed  There were no vitals filed for this visit.        6/12/2024     8:00 PM 6/12/2024     8:37 PM 6/21/2024     8:20 AM 6/27/2024     7:48 AM 7/3/2024     8:52 AM 7/3/2024     9:54 AM 7/10/2024    10:30 AM  "  Vitals   Systolic 128 127 169  181 179 132   Diastolic 74 64 79  77 71 75   Heart Rate 61 62 72  67 74 68   Temp   36.4 °C (97.5 °F)   36.2 °C (97.2 °F) 36.3 °C (97.3 °F)   Resp 19 18 18   18 16   Height (in)      1.626 m (5' 4.02\")    Weight (lb)   167.11 180 163 164.1 161.16   BMI   28.68 kg/m2 30.9 kg/m2 27.98 kg/m2 28.15 kg/m2 27.65 kg/m2   BSA (m2)   1.85 m2 1.92 m2 1.83 m2 1.83 m2 1.82 m2   Visit Report     Report Report Report        Physical Exam  HENT:      Head: Normocephalic and atraumatic.      Mouth/Throat:      Mouth: Mucous membranes are dry.      Pharynx: Oropharynx is clear.   Eyes:      Extraocular Movements: Extraocular movements intact.      Conjunctiva/sclera: Conjunctivae normal.   Pulmonary:      Effort: Pulmonary effort is normal.   Abdominal:      General: Abdomen is flat.   Musculoskeletal:      Comments: Right arm weakness   Soft mass of upper arm that is mildly tender  No edema, erythema, bruising     Skin:     General: Skin is warm and dry.   Neurological:      General: No focal deficit present.      Mental Status: She is alert.   Psychiatric:         Mood and Affect: Mood normal.         Thought Content: Thought content normal.         ASSESSMENT/PLAN    Pain  Pain is: cancer related pain and possibly due to biceps tendon tear  Type: somatic  - Stop norco  - Start percocet 5/325mg 1-1.5mg q4 PRN (no additional tyleonl)  - Start dexamethasone 4mg daily with PPI   - Avoid NSAIDs due to renal function and anticoagulation   - Ice arm PRN  - Patient / family decline further work up of arm pain     Opioid Use  Medication Management:   - OARRS report reviewed with no aberrant behavior; consistent with  prescriptions/records and patient history  - MED 40.  Overdose Risk Score 080.   This has been discussed with patient.   - We will continue to closely monitor the patient for signs of prescription misuse including UDS, OARRS review and subjective reports at each visit.  - No concurrent " benzodiazepine use   - I am a provider who either is or has consulted and collaborated with a provider certified in Hospice and Palliative Medicine and have conducted a face-face visit and examination for this patient.  - Routine Urine Drug Screen completed by pain mgmt 6/13/24  - Controlled Substance Agreement completed by pain mgmt 1/11/24  - Specifically discussed that controlled substance prescriptions will only be provided by our group as outlined in the completed agreement  - Prescribed naloxone Rx'ed by pain mgmt   - Red Flags: none     Nausea  Dysgeusia    Intermittent nausea without vomiting related to opioids   - Dex per above  - Continue prochlorperazine 10mg q6 PRN first line   - Continue ondansetron 4mg q8 PRN second line - reduced due to Qtc prolongation 491 2/28/23- discussed risks/benefits of Qtc prolongation with patient and family - ok to administer as needed due to goal of comfort care   - Use plastic silverware     Constipation   At risk for constipation related to opioids  - Dulcolax 5-10mg q4 PRN in no BM in 2 days  - Having loose stools now that are chronic, no recent abx or hospitalizations - patient declines stool studies     Xerostomia 2/2 opioids  - Hard candy / Ice chips PRN   - Biotene spray PRN     Altered Mood  Acute anxiety and depression related to health concerns and pain   - Declined antidepressant or anxiolytic     Decreased appetite  Weight loss   - Dex per above  - Comfort diet     Cancer related fatigue  - Dex per above     Introduction to Supportive and Palliative Oncology:  Spoke with patient and children    Introduced the role and philosophy of Supportive and Palliative oncology in the evaluation and management of symptoms during cancer treatment  Palliative care was introduced as a service for patients with serious illness to help with symptoms, assist with goals of care conversations, navigate complex decision making, improve quality of life for patients, and provide  support both patients and families.  Patient seemed to appreciate the extra layer of support.    Medical Decision Making/Goals of Care/Advance Care Planning:  Patient's current clinical condition, including diagnosis, prognosis, and management plan, and goals of care were discussed.   Life limiting disease: metastatic malignancy  Family: Supportive children   Goals:  palliative radiation then hospice referral (likely HOWR at home)     Advance Directives  Existence of Advance Directives:Yes, documentation or copy in medical record  Decision maker: NICOLASOA is Magaly bobby  Code Status: DNR    Next Follow-Up Visit:  Return to clinic in 2 weeks virtual visit     Signature and billing  Thank you for allowing us to participate in the care of this patient. Recommendations will be communicated back to the consulting service by way of shared electronic medical record or face-to-face.    Medical complexity was moderate level due to due to complexity of problems, extensive data review, and high risk of management/treatment.  Time was spent on the following: Prep Time, Time Directly with Patient/Family/Caregiver, Documentation Time. Total time spent: 45 min       DATA   Diagnostic tests and information reviewed for today's visit:  Most recent labs and imaging results, Medications       Plan of Care discussed with: Patient, Family/Significant Other: son and daughter , and RN      SIGNATURE: Marcy Meyer, APRN-CNP    Contact information:  Supportive and Palliative Oncology  Monday-Friday 8 AM-5 PM  Phone:  [phone], press option #5, then option #1.   Or Epic Secure Chat

## 2024-07-17 ENCOUNTER — APPOINTMENT (OUTPATIENT)
Dept: RADIATION ONCOLOGY | Facility: CLINIC | Age: 84
End: 2024-07-17
Payer: MEDICARE

## 2024-07-17 ENCOUNTER — APPOINTMENT (OUTPATIENT)
Dept: SURGICAL ONCOLOGY | Facility: CLINIC | Age: 84
End: 2024-07-17
Payer: MEDICARE

## 2024-07-18 ENCOUNTER — HOSPITAL ENCOUNTER (OUTPATIENT)
Dept: RADIATION ONCOLOGY | Facility: CLINIC | Age: 84
Setting detail: RADIATION/ONCOLOGY SERIES
Discharge: HOME | End: 2024-07-18
Payer: MEDICARE

## 2024-07-18 ENCOUNTER — APPOINTMENT (OUTPATIENT)
Dept: RADIATION ONCOLOGY | Facility: CLINIC | Age: 84
End: 2024-07-18
Payer: MEDICARE

## 2024-07-18 DIAGNOSIS — C79.51 SECONDARY MALIGNANT NEOPLASM OF BONE (MULTI): ICD-10-CM

## 2024-07-18 DIAGNOSIS — C79.31 SECONDARY MALIGNANT NEOPLASM OF BRAIN (MULTI): ICD-10-CM

## 2024-07-18 DIAGNOSIS — C50.811 MALIGNANT NEOPLASM OF OVERLAPPING SITES OF RIGHT FEMALE BREAST (MULTI): ICD-10-CM

## 2024-07-18 LAB
RAD ONC MSQ ACTUAL FRACTIONS DELIVERED: 1
RAD ONC MSQ ACTUAL SESSION DELIVERED DOSE: 300 CGRAY
RAD ONC MSQ ACTUAL TOTAL DOSE: 300 CGRAY
RAD ONC MSQ ELAPSED DAYS: 0
RAD ONC MSQ LAST DATE: NORMAL
RAD ONC MSQ PRESCRIBED FRACTIONAL DOSE: 300 CGRAY
RAD ONC MSQ PRESCRIBED NUMBER OF FRACTIONS: 10
RAD ONC MSQ PRESCRIBED TECHNIQUE: NORMAL
RAD ONC MSQ PRESCRIBED TOTAL DOSE: 3000 CGRAY
RAD ONC MSQ START DATE: NORMAL
RAD ONC MSQ TREATMENT COURSE NUMBER: 2
RAD ONC MSQ TREATMENT SITE: NORMAL

## 2024-07-18 PROCEDURE — 77412 RADIATION TX DELIVERY LVL 3: CPT | Performed by: RADIOLOGY

## 2024-07-19 ENCOUNTER — HOSPITAL ENCOUNTER (OUTPATIENT)
Dept: RADIATION ONCOLOGY | Facility: CLINIC | Age: 84
Setting detail: RADIATION/ONCOLOGY SERIES
Discharge: HOME | End: 2024-07-19
Payer: MEDICARE

## 2024-07-19 ENCOUNTER — TELEPHONE (OUTPATIENT)
Dept: PALLIATIVE MEDICINE | Facility: HOSPITAL | Age: 84
End: 2024-07-19
Payer: MEDICARE

## 2024-07-19 ENCOUNTER — RADIATION ONCOLOGY OTV (OUTPATIENT)
Dept: RADIATION ONCOLOGY | Facility: CLINIC | Age: 84
End: 2024-07-19
Payer: MEDICARE

## 2024-07-19 ENCOUNTER — DOCUMENTATION (OUTPATIENT)
Dept: RADIATION ONCOLOGY | Facility: CLINIC | Age: 84
End: 2024-07-19

## 2024-07-19 VITALS
BODY MASS INDEX: 27.6 KG/M2 | HEART RATE: 92 BPM | SYSTOLIC BLOOD PRESSURE: 167 MMHG | TEMPERATURE: 96.3 F | DIASTOLIC BLOOD PRESSURE: 68 MMHG | WEIGHT: 160.9 LBS | OXYGEN SATURATION: 96 %

## 2024-07-19 DIAGNOSIS — B37.0 THRUSH: Primary | ICD-10-CM

## 2024-07-19 DIAGNOSIS — R06.6 HICCUPS: Primary | ICD-10-CM

## 2024-07-19 PROCEDURE — 77387 GUIDANCE FOR RADJ TX DLVR: CPT | Performed by: RADIOLOGY

## 2024-07-19 PROCEDURE — 77412 RADIATION TX DELIVERY LVL 3: CPT | Performed by: RADIOLOGY

## 2024-07-19 RX ORDER — NYSTATIN 100000 [USP'U]/ML
500000 SUSPENSION ORAL 4 TIMES DAILY
Qty: 200 ML | Refills: 0 | Status: SHIPPED | OUTPATIENT
Start: 2024-07-19 | End: 2024-07-29

## 2024-07-19 RX ORDER — BACLOFEN 5 MG/1
2.5 TABLET ORAL 3 TIMES DAILY
Qty: 21 TABLET | Refills: 0 | Status: SHIPPED | OUTPATIENT
Start: 2024-07-19 | End: 2024-08-02

## 2024-07-19 RX ORDER — NYSTATIN 100000 [USP'U]/ML
500000 SUSPENSION ORAL 4 TIMES DAILY
COMMUNITY
End: 2024-07-19 | Stop reason: SDUPTHER

## 2024-07-19 ASSESSMENT — PAIN SCALES - GENERAL: PAINLEVEL: 0-NO PAIN

## 2024-07-19 NOTE — TELEPHONE ENCOUNTER
Marcy Meyer NP with supportive oncology contacted regarding patient's thrush.  Nystatin 500,000 units QID x 10 days sent into [facility] pharmacy by provider.  Magaly contacted and updated.   Yes

## 2024-07-19 NOTE — TELEPHONE ENCOUNTER
Discussed with Marcy Meyer and she prescribed Baclofen 2.5 mg TID prn. 5mg tab prescribed, take 1/2 tab TID. Called daughter Magaly and confirmed dosage. Sending to [facility] pharmacy [facility]

## 2024-07-19 NOTE — PROGRESS NOTES
Radiation Oncology On Treatment Visit    Patient Name:  Allyson Granados  MRN:  [id number]  :  1940    Referring Provider: No ref. provider found  Primary Care Provider: KELLI Maciel  Care Team: Patient Care Team:  KELLI Maciel as PCP - General (Family Medicine)  Jaun England MD as Surgeon (Pain Medicine)  Chung FOWLER MD as Cardiologist (Cardiology)  KELLI Gaspar as Consulting Physician (Radiation Oncology)  Milly Mendoza MD as Surgeon (Pulmonary Disease)  Odessa Nicole MD as Radiation Oncologist (Radiation Oncology)  Ana Paula Ackerman MD as Consulting Physician (Nephrology)  Boy Bates MD as Consulting Physician (Hematology and Oncology)    Date of Service: 2024     Diagnosis:   Specialty Problems          Radiation Oncology Problems    Malignant neoplasm of breast (Multi)        Ductal carcinoma of left breast, stage 3 (Multi)        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)        Malignant neoplasm involving both nipple and areola in both breasts in female, estrogen receptor negative (Multi)         Treatment Summary:  Simulation: S    Treatment Period Technique Fraction Dose Fractions Total Dose   Course 1 2023-10/13/2023  (days elapsed: 31)         Left breast CNI 2023-10/13/2023 VMAT 245 / 245 cGy  5880 / 6,125 cGy      3D CRT: Not Applicable Brain    Treatment Period Technique Fraction Dose Fractions Total Dose   Course 2 2024-2024  (days elapsed: 0)         brain/r orbit 2024-2024 3D 300 / 300 cGy 1 / 10 300 / 3,000 cGy     SUBJECTIVE: Thrush noted in mouth, nystatin called in for patient, education provided.      OBJECTIVE:   Vital Signs:  /68 (BP Location: Left arm, Patient Position: Sitting, BP Cuff Size: Adult)   Pulse 92   Temp 35.7 °C (96.3 °F)   Wt 73 kg (160 lb 14.4 oz)   SpO2 96%   BMI 27.60 kg/m²    Pain Scale: The patient's current pain level was assessed.   They report currently having a pain of 3 out of 10.    Other Pertinent Findings: Thrush in the mouth.    Toxicity Assessment          7/19/2024    16:08   Toxicity Assessment   Adverse Events Reviewed (WDL) No (Exceptions to WDL)   Treatment Site Breast   Anorexia Grade 0       thrush noted - nystatin called in per MD   Anxiety Grade 0   Dehydration Grade 0   Depression Grade 0   Dermatitis Radiation Grade 0   Diarrhea Grade 0   Fatigue Grade 1   Nausea Grade 1       caused from hiccups   Pain Grade 1       all over back   Dry Mouth Grade 1       biotene and nystatin   Breast Infection Grade 0   Edema Limbs Grade 1        Assessment / Plan:  The patient is tolerating radiation therapy as anticipated.  Continue per current treatment plan.   Treated the hip today.  Will finish the brain next week.  Chart and films reviewed and approved.

## 2024-07-20 ENCOUNTER — APPOINTMENT (OUTPATIENT)
Dept: RADIOLOGY | Facility: HOSPITAL | Age: 84
End: 2024-07-20
Payer: MEDICARE

## 2024-07-20 ENCOUNTER — HOSPITAL ENCOUNTER (OUTPATIENT)
Facility: HOSPITAL | Age: 84
Setting detail: OBSERVATION
Discharge: HOSPICE | End: 2024-07-21
Attending: INTERNAL MEDICINE | Admitting: INTERNAL MEDICINE
Payer: MEDICARE

## 2024-07-20 DIAGNOSIS — R11.2 NAUSEA AND VOMITING, UNSPECIFIED VOMITING TYPE: Primary | ICD-10-CM

## 2024-07-20 PROBLEM — R10.9 ABDOMINAL PAIN: Status: ACTIVE | Noted: 2024-07-20

## 2024-07-20 LAB
ALBUMIN SERPL BCP-MCNC: 3 G/DL (ref 3.4–5)
ALP SERPL-CCNC: 54 U/L (ref 33–136)
ALT SERPL W P-5'-P-CCNC: 27 U/L (ref 7–45)
ANION GAP SERPL CALC-SCNC: 13 MMOL/L (ref 10–20)
APPEARANCE UR: CLEAR
AST SERPL W P-5'-P-CCNC: 21 U/L (ref 9–39)
BACTERIA #/AREA URNS AUTO: ABNORMAL /HPF
BASOPHILS # BLD AUTO: 0.02 X10*3/UL (ref 0–0.1)
BASOPHILS NFR BLD AUTO: 0.1 %
BILIRUB SERPL-MCNC: 0.7 MG/DL (ref 0–1.2)
BILIRUB UR STRIP.AUTO-MCNC: NEGATIVE MG/DL
BUN SERPL-MCNC: 27 MG/DL (ref 6–23)
CALCIUM SERPL-MCNC: 7.8 MG/DL (ref 8.6–10.3)
CARDIAC TROPONIN I PNL SERPL HS: 21 NG/L (ref 0–13)
CARDIAC TROPONIN I PNL SERPL HS: 24 NG/L (ref 0–13)
CHLORIDE SERPL-SCNC: 103 MMOL/L (ref 98–107)
CO2 SERPL-SCNC: 30 MMOL/L (ref 21–32)
COLOR UR: COLORLESS
CREAT SERPL-MCNC: 0.92 MG/DL (ref 0.5–1.05)
EGFRCR SERPLBLD CKD-EPI 2021: 61 ML/MIN/1.73M*2
EOSINOPHIL # BLD AUTO: 0 X10*3/UL (ref 0–0.4)
EOSINOPHIL NFR BLD AUTO: 0 %
ERYTHROCYTE [DISTWIDTH] IN BLOOD BY AUTOMATED COUNT: 16.7 % (ref 11.5–14.5)
GLUCOSE SERPL-MCNC: 113 MG/DL (ref 74–99)
GLUCOSE UR STRIP.AUTO-MCNC: NORMAL MG/DL
HCT VFR BLD AUTO: 40 % (ref 36–46)
HGB BLD-MCNC: 12.3 G/DL (ref 12–16)
IMM GRANULOCYTES # BLD AUTO: 0.22 X10*3/UL (ref 0–0.5)
IMM GRANULOCYTES NFR BLD AUTO: 1.4 % (ref 0–0.9)
KETONES UR STRIP.AUTO-MCNC: NEGATIVE MG/DL
LACTATE SERPL-SCNC: 1.3 MMOL/L (ref 0.4–2)
LEUKOCYTE ESTERASE UR QL STRIP.AUTO: NEGATIVE
LIPASE SERPL-CCNC: 20 U/L (ref 9–82)
LYMPHOCYTES # BLD AUTO: 0.54 X10*3/UL (ref 0.8–3)
LYMPHOCYTES NFR BLD AUTO: 3.3 %
MAGNESIUM SERPL-MCNC: 2 MG/DL (ref 1.6–2.4)
MCH RBC QN AUTO: 27.2 PG (ref 26–34)
MCHC RBC AUTO-ENTMCNC: 30.8 G/DL (ref 32–36)
MCV RBC AUTO: 88 FL (ref 80–100)
MONOCYTES # BLD AUTO: 0.8 X10*3/UL (ref 0.05–0.8)
MONOCYTES NFR BLD AUTO: 4.9 %
NEUTROPHILS # BLD AUTO: 14.59 X10*3/UL (ref 1.6–5.5)
NEUTROPHILS NFR BLD AUTO: 90.3 %
NITRITE UR QL STRIP.AUTO: ABNORMAL
NRBC BLD-RTO: 0 /100 WBCS (ref 0–0)
PH UR STRIP.AUTO: 7.5 [PH]
PLATELET # BLD AUTO: 271 X10*3/UL (ref 150–450)
POTASSIUM SERPL-SCNC: 3.5 MMOL/L (ref 3.5–5.3)
PROT SERPL-MCNC: 6 G/DL (ref 6.4–8.2)
PROT UR STRIP.AUTO-MCNC: NEGATIVE MG/DL
RBC # BLD AUTO: 4.53 X10*6/UL (ref 4–5.2)
RBC # UR STRIP.AUTO: ABNORMAL /UL
RBC #/AREA URNS AUTO: ABNORMAL /HPF
SARS-COV-2 RNA RESP QL NAA+PROBE: NOT DETECTED
SODIUM SERPL-SCNC: 142 MMOL/L (ref 136–145)
SP GR UR STRIP.AUTO: 1.02
UROBILINOGEN UR STRIP.AUTO-MCNC: NORMAL MG/DL
WBC # BLD AUTO: 16.2 X10*3/UL (ref 4.4–11.3)
WBC #/AREA URNS AUTO: ABNORMAL /HPF

## 2024-07-20 PROCEDURE — 84484 ASSAY OF TROPONIN QUANT: CPT | Performed by: INTERNAL MEDICINE

## 2024-07-20 PROCEDURE — 83605 ASSAY OF LACTIC ACID: CPT | Performed by: INTERNAL MEDICINE

## 2024-07-20 PROCEDURE — 87086 URINE CULTURE/COLONY COUNT: CPT | Mod: AHULAB | Performed by: INTERNAL MEDICINE

## 2024-07-20 PROCEDURE — 36415 COLL VENOUS BLD VENIPUNCTURE: CPT | Performed by: INTERNAL MEDICINE

## 2024-07-20 PROCEDURE — 70450 CT HEAD/BRAIN W/O DYE: CPT

## 2024-07-20 PROCEDURE — 2500000004 HC RX 250 GENERAL PHARMACY W/ HCPCS (ALT 636 FOR OP/ED): Performed by: INTERNAL MEDICINE

## 2024-07-20 PROCEDURE — 2550000001 HC RX 255 CONTRASTS: Performed by: INTERNAL MEDICINE

## 2024-07-20 PROCEDURE — 99285 EMERGENCY DEPT VISIT HI MDM: CPT | Mod: 25

## 2024-07-20 PROCEDURE — 80053 COMPREHEN METABOLIC PANEL: CPT | Performed by: INTERNAL MEDICINE

## 2024-07-20 PROCEDURE — 81001 URINALYSIS AUTO W/SCOPE: CPT | Performed by: INTERNAL MEDICINE

## 2024-07-20 PROCEDURE — 83735 ASSAY OF MAGNESIUM: CPT | Performed by: INTERNAL MEDICINE

## 2024-07-20 PROCEDURE — 71046 X-RAY EXAM CHEST 2 VIEWS: CPT

## 2024-07-20 PROCEDURE — 74177 CT ABD & PELVIS W/CONTRAST: CPT

## 2024-07-20 PROCEDURE — 71046 X-RAY EXAM CHEST 2 VIEWS: CPT | Performed by: RADIOLOGY

## 2024-07-20 PROCEDURE — 85025 COMPLETE CBC W/AUTO DIFF WBC: CPT | Performed by: INTERNAL MEDICINE

## 2024-07-20 PROCEDURE — 83690 ASSAY OF LIPASE: CPT | Performed by: INTERNAL MEDICINE

## 2024-07-20 PROCEDURE — 74177 CT ABD & PELVIS W/CONTRAST: CPT | Performed by: RADIOLOGY

## 2024-07-20 PROCEDURE — 96374 THER/PROPH/DIAG INJ IV PUSH: CPT | Mod: 59

## 2024-07-20 PROCEDURE — 87635 SARS-COV-2 COVID-19 AMP PRB: CPT | Performed by: INTERNAL MEDICINE

## 2024-07-20 RX ORDER — ONDANSETRON HYDROCHLORIDE 2 MG/ML
4 INJECTION, SOLUTION INTRAVENOUS ONCE
Status: DISCONTINUED | OUTPATIENT
Start: 2024-07-20 | End: 2024-07-21 | Stop reason: HOSPADM

## 2024-07-20 RX ORDER — PANTOPRAZOLE SODIUM 40 MG/10ML
40 INJECTION, POWDER, LYOPHILIZED, FOR SOLUTION INTRAVENOUS
Status: DISCONTINUED | OUTPATIENT
Start: 2024-07-21 | End: 2024-07-21 | Stop reason: HOSPADM

## 2024-07-20 RX ORDER — ACETAMINOPHEN 650 MG/1
650 SUPPOSITORY RECTAL EVERY 4 HOURS PRN
Status: DISCONTINUED | OUTPATIENT
Start: 2024-07-20 | End: 2024-07-21

## 2024-07-20 RX ORDER — ACETAMINOPHEN 325 MG/1
650 TABLET ORAL EVERY 4 HOURS PRN
Status: DISCONTINUED | OUTPATIENT
Start: 2024-07-20 | End: 2024-07-21

## 2024-07-20 RX ORDER — ONDANSETRON 4 MG/1
4 TABLET, FILM COATED ORAL EVERY 8 HOURS PRN
Status: DISCONTINUED | OUTPATIENT
Start: 2024-07-20 | End: 2024-07-21 | Stop reason: HOSPADM

## 2024-07-20 RX ORDER — HYDRALAZINE HYDROCHLORIDE 20 MG/ML
20 INJECTION INTRAMUSCULAR; INTRAVENOUS ONCE
Status: COMPLETED | OUTPATIENT
Start: 2024-07-20 | End: 2024-07-20

## 2024-07-20 RX ORDER — PANTOPRAZOLE SODIUM 40 MG/1
40 TABLET, DELAYED RELEASE ORAL
Status: DISCONTINUED | OUTPATIENT
Start: 2024-07-21 | End: 2024-07-21 | Stop reason: HOSPADM

## 2024-07-20 RX ORDER — ACETAMINOPHEN 160 MG/5ML
650 SOLUTION ORAL EVERY 4 HOURS PRN
Status: DISCONTINUED | OUTPATIENT
Start: 2024-07-20 | End: 2024-07-21

## 2024-07-20 RX ORDER — OXYCODONE AND ACETAMINOPHEN 5; 325 MG/1; MG/1
1 TABLET ORAL ONCE
Status: DISCONTINUED | OUTPATIENT
Start: 2024-07-20 | End: 2024-07-21 | Stop reason: HOSPADM

## 2024-07-20 RX ORDER — SODIUM CHLORIDE 9 MG/ML
100 INJECTION, SOLUTION INTRAVENOUS CONTINUOUS
Status: ACTIVE | OUTPATIENT
Start: 2024-07-20 | End: 2024-07-21

## 2024-07-20 RX ORDER — ONDANSETRON HYDROCHLORIDE 2 MG/ML
4 INJECTION, SOLUTION INTRAVENOUS EVERY 8 HOURS PRN
Status: DISCONTINUED | OUTPATIENT
Start: 2024-07-20 | End: 2024-07-21 | Stop reason: HOSPADM

## 2024-07-20 RX ADMIN — HYDRALAZINE HYDROCHLORIDE 20 MG: 20 INJECTION INTRAMUSCULAR; INTRAVENOUS at 23:10

## 2024-07-20 RX ADMIN — HYDROMORPHONE HYDROCHLORIDE 0.5 MG: 1 INJECTION, SOLUTION INTRAMUSCULAR; INTRAVENOUS; SUBCUTANEOUS at 23:10

## 2024-07-20 RX ADMIN — SODIUM CHLORIDE 1000 ML: 9 INJECTION, SOLUTION INTRAVENOUS at 20:42

## 2024-07-20 RX ADMIN — IOHEXOL 75 ML: 350 INJECTION, SOLUTION INTRAVENOUS at 22:26

## 2024-07-20 ASSESSMENT — PAIN - FUNCTIONAL ASSESSMENT: PAIN_FUNCTIONAL_ASSESSMENT: 0-10

## 2024-07-20 ASSESSMENT — COLUMBIA-SUICIDE SEVERITY RATING SCALE - C-SSRS
6. HAVE YOU EVER DONE ANYTHING, STARTED TO DO ANYTHING, OR PREPARED TO DO ANYTHING TO END YOUR LIFE?: NO
2. HAVE YOU ACTUALLY HAD ANY THOUGHTS OF KILLING YOURSELF?: NO
1. IN THE PAST MONTH, HAVE YOU WISHED YOU WERE DEAD OR WISHED YOU COULD GO TO SLEEP AND NOT WAKE UP?: NO

## 2024-07-20 ASSESSMENT — PAIN SCALES - GENERAL
PAINLEVEL_OUTOF10: 10 - WORST POSSIBLE PAIN
PAINLEVEL_OUTOF10: 0 - NO PAIN

## 2024-07-21 VITALS
HEIGHT: 62 IN | DIASTOLIC BLOOD PRESSURE: 71 MMHG | SYSTOLIC BLOOD PRESSURE: 171 MMHG | BODY MASS INDEX: 30.18 KG/M2 | RESPIRATION RATE: 18 BRPM | HEART RATE: 58 BPM | TEMPERATURE: 97 F | OXYGEN SATURATION: 96 % | WEIGHT: 164 LBS

## 2024-07-21 LAB
ANION GAP SERPL CALC-SCNC: 12 MMOL/L (ref 10–20)
BUN SERPL-MCNC: 24 MG/DL (ref 6–23)
CALCIUM SERPL-MCNC: 7.4 MG/DL (ref 8.6–10.3)
CHLORIDE SERPL-SCNC: 107 MMOL/L (ref 98–107)
CO2 SERPL-SCNC: 27 MMOL/L (ref 21–32)
CREAT SERPL-MCNC: 0.81 MG/DL (ref 0.5–1.05)
EGFRCR SERPLBLD CKD-EPI 2021: 71 ML/MIN/1.73M*2
ERYTHROCYTE [DISTWIDTH] IN BLOOD BY AUTOMATED COUNT: 17.2 % (ref 11.5–14.5)
GLUCOSE SERPL-MCNC: 105 MG/DL (ref 74–99)
HCT VFR BLD AUTO: 41.1 % (ref 36–46)
HGB BLD-MCNC: 11.9 G/DL (ref 12–16)
MCH RBC QN AUTO: 27 PG (ref 26–34)
MCHC RBC AUTO-ENTMCNC: 29 G/DL (ref 32–36)
MCV RBC AUTO: 93 FL (ref 80–100)
NRBC BLD-RTO: 0 /100 WBCS (ref 0–0)
PLATELET # BLD AUTO: 250 X10*3/UL (ref 150–450)
POTASSIUM SERPL-SCNC: 3.4 MMOL/L (ref 3.5–5.3)
RBC # BLD AUTO: 4.4 X10*6/UL (ref 4–5.2)
SODIUM SERPL-SCNC: 143 MMOL/L (ref 136–145)
WBC # BLD AUTO: 15.8 X10*3/UL (ref 4.4–11.3)

## 2024-07-21 PROCEDURE — G0378 HOSPITAL OBSERVATION PER HR: HCPCS

## 2024-07-21 PROCEDURE — 80048 BASIC METABOLIC PNL TOTAL CA: CPT | Performed by: INTERNAL MEDICINE

## 2024-07-21 PROCEDURE — 96376 TX/PRO/DX INJ SAME DRUG ADON: CPT

## 2024-07-21 PROCEDURE — 2500000004 HC RX 250 GENERAL PHARMACY W/ HCPCS (ALT 636 FOR OP/ED): Performed by: INTERNAL MEDICINE

## 2024-07-21 PROCEDURE — 85027 COMPLETE CBC AUTOMATED: CPT | Performed by: INTERNAL MEDICINE

## 2024-07-21 PROCEDURE — 36415 COLL VENOUS BLD VENIPUNCTURE: CPT | Performed by: INTERNAL MEDICINE

## 2024-07-21 PROCEDURE — 2500000001 HC RX 250 WO HCPCS SELF ADMINISTERED DRUGS (ALT 637 FOR MEDICARE OP): Performed by: INTERNAL MEDICINE

## 2024-07-21 PROCEDURE — 99222 1ST HOSP IP/OBS MODERATE 55: CPT | Performed by: INTERNAL MEDICINE

## 2024-07-21 RX ORDER — DEXAMETHASONE 4 MG/1
4 TABLET ORAL DAILY
Status: DISCONTINUED | OUTPATIENT
Start: 2024-07-21 | End: 2024-07-21 | Stop reason: HOSPADM

## 2024-07-21 RX ORDER — LOSARTAN POTASSIUM 50 MG/1
50 TABLET ORAL 2 TIMES DAILY
Status: DISCONTINUED | OUTPATIENT
Start: 2024-07-21 | End: 2024-07-21 | Stop reason: HOSPADM

## 2024-07-21 RX ORDER — ACETAMINOPHEN 325 MG/1
975 TABLET ORAL EVERY 8 HOURS
Status: DISCONTINUED | OUTPATIENT
Start: 2024-07-21 | End: 2024-07-21 | Stop reason: HOSPADM

## 2024-07-21 RX ORDER — LEVOTHYROXINE SODIUM 50 UG/1
50 TABLET ORAL
Status: DISCONTINUED | OUTPATIENT
Start: 2024-07-21 | End: 2024-07-21 | Stop reason: HOSPADM

## 2024-07-21 RX ORDER — POTASSIUM CHLORIDE 20 MEQ/1
40 TABLET, EXTENDED RELEASE ORAL ONCE
Status: DISCONTINUED | OUTPATIENT
Start: 2024-07-21 | End: 2024-07-21 | Stop reason: HOSPADM

## 2024-07-21 RX ORDER — ALBUTEROL SULFATE 0.83 MG/ML
2.5 SOLUTION RESPIRATORY (INHALATION) EVERY 4 HOURS PRN
Status: DISCONTINUED | OUTPATIENT
Start: 2024-07-21 | End: 2024-07-21

## 2024-07-21 RX ORDER — METOPROLOL SUCCINATE 50 MG/1
50 TABLET, EXTENDED RELEASE ORAL DAILY
Status: DISCONTINUED | OUTPATIENT
Start: 2024-07-21 | End: 2024-07-21 | Stop reason: HOSPADM

## 2024-07-21 RX ORDER — HYDROMORPHONE HYDROCHLORIDE 1 MG/ML
0.6 INJECTION, SOLUTION INTRAMUSCULAR; INTRAVENOUS; SUBCUTANEOUS EVERY 4 HOURS PRN
Status: DISCONTINUED | OUTPATIENT
Start: 2024-07-21 | End: 2024-07-21 | Stop reason: HOSPADM

## 2024-07-21 RX ORDER — AMIODARONE HYDROCHLORIDE 200 MG/1
200 TABLET ORAL
Status: DISCONTINUED | OUTPATIENT
Start: 2024-07-21 | End: 2024-07-21 | Stop reason: HOSPADM

## 2024-07-21 RX ORDER — ALBUTEROL SULFATE 0.83 MG/ML
2.5 SOLUTION RESPIRATORY (INHALATION)
Status: DISCONTINUED | OUTPATIENT
Start: 2024-07-21 | End: 2024-07-21 | Stop reason: HOSPADM

## 2024-07-21 RX ORDER — PRAVASTATIN SODIUM 40 MG/1
40 TABLET ORAL DAILY
Status: DISCONTINUED | OUTPATIENT
Start: 2024-07-21 | End: 2024-07-21 | Stop reason: HOSPADM

## 2024-07-21 RX ORDER — BACLOFEN 5 MG/1
2.5 TABLET ORAL 3 TIMES DAILY
Status: DISCONTINUED | OUTPATIENT
Start: 2024-07-21 | End: 2024-07-21 | Stop reason: HOSPADM

## 2024-07-21 RX ORDER — ROPINIROLE 1 MG/1
4 TABLET, FILM COATED ORAL 2 TIMES DAILY
Status: DISCONTINUED | OUTPATIENT
Start: 2024-07-21 | End: 2024-07-21 | Stop reason: HOSPADM

## 2024-07-21 RX ORDER — NYSTATIN 100000 [USP'U]/ML
500000 SUSPENSION ORAL 4 TIMES DAILY
Status: DISCONTINUED | OUTPATIENT
Start: 2024-07-21 | End: 2024-07-21 | Stop reason: HOSPADM

## 2024-07-21 RX ORDER — ALBUTEROL SULFATE 0.83 MG/ML
2.5 SOLUTION RESPIRATORY (INHALATION) EVERY 2 HOUR PRN
Status: DISCONTINUED | OUTPATIENT
Start: 2024-07-21 | End: 2024-07-21 | Stop reason: HOSPADM

## 2024-07-21 RX ADMIN — LEVOTHYROXINE SODIUM 50 MCG: 50 TABLET ORAL at 06:31

## 2024-07-21 RX ADMIN — PANTOPRAZOLE SODIUM 40 MG: 40 TABLET, DELAYED RELEASE ORAL at 09:58

## 2024-07-21 RX ADMIN — DEXAMETHASONE 4 MG: 4 TABLET ORAL at 09:49

## 2024-07-21 RX ADMIN — LOSARTAN POTASSIUM 50 MG: 50 TABLET, FILM COATED ORAL at 09:49

## 2024-07-21 RX ADMIN — HYDROMORPHONE HYDROCHLORIDE 0.6 MG: 1 INJECTION, SOLUTION INTRAMUSCULAR; INTRAVENOUS; SUBCUTANEOUS at 04:44

## 2024-07-21 RX ADMIN — METOPROLOL SUCCINATE 50 MG: 50 TABLET, EXTENDED RELEASE ORAL at 09:50

## 2024-07-21 RX ADMIN — PRAVASTATIN SODIUM 40 MG: 40 TABLET ORAL at 09:49

## 2024-07-21 RX ADMIN — ROPINIROLE HYDROCHLORIDE 4 MG: 1 TABLET, FILM COATED ORAL at 09:49

## 2024-07-21 RX ADMIN — APIXABAN 5 MG: 5 TABLET, FILM COATED ORAL at 09:49

## 2024-07-21 RX ADMIN — HYDROMORPHONE HYDROCHLORIDE 0.6 MG: 1 INJECTION, SOLUTION INTRAMUSCULAR; INTRAVENOUS; SUBCUTANEOUS at 14:46

## 2024-07-21 RX ADMIN — NYSTATIN 500000 UNITS: 100000 SUSPENSION ORAL at 06:31

## 2024-07-21 RX ADMIN — BACLOFEN 2.5 MG: 5 TABLET ORAL at 09:49

## 2024-07-21 SDOH — SOCIAL STABILITY: SOCIAL INSECURITY: DO YOU FEEL ANYONE HAS EXPLOITED OR TAKEN ADVANTAGE OF YOU FINANCIALLY OR OF YOUR PERSONAL PROPERTY?: NO

## 2024-07-21 SDOH — SOCIAL STABILITY: SOCIAL INSECURITY: HAVE YOU HAD ANY THOUGHTS OF HARMING ANYONE ELSE?: NO

## 2024-07-21 SDOH — SOCIAL STABILITY: SOCIAL INSECURITY: WERE YOU ABLE TO COMPLETE ALL THE BEHAVIORAL HEALTH SCREENINGS?: YES

## 2024-07-21 SDOH — SOCIAL STABILITY: SOCIAL INSECURITY: ABUSE: ADULT

## 2024-07-21 SDOH — SOCIAL STABILITY: SOCIAL INSECURITY: ARE YOU OR HAVE YOU BEEN THREATENED OR ABUSED PHYSICALLY, EMOTIONALLY, OR SEXUALLY BY ANYONE?: NO

## 2024-07-21 SDOH — SOCIAL STABILITY: SOCIAL INSECURITY: HAS ANYONE EVER THREATENED TO HURT YOUR FAMILY OR YOUR PETS?: NO

## 2024-07-21 SDOH — SOCIAL STABILITY: SOCIAL INSECURITY: DOES ANYONE TRY TO KEEP YOU FROM HAVING/CONTACTING OTHER FRIENDS OR DOING THINGS OUTSIDE YOUR HOME?: NO

## 2024-07-21 SDOH — SOCIAL STABILITY: SOCIAL INSECURITY: HAVE YOU HAD THOUGHTS OF HARMING ANYONE ELSE?: NO

## 2024-07-21 SDOH — SOCIAL STABILITY: SOCIAL INSECURITY: DO YOU FEEL UNSAFE GOING BACK TO THE PLACE WHERE YOU ARE LIVING?: NO

## 2024-07-21 SDOH — SOCIAL STABILITY: SOCIAL INSECURITY: ARE THERE ANY APPARENT SIGNS OF INJURIES/BEHAVIORS THAT COULD BE RELATED TO ABUSE/NEGLECT?: NO

## 2024-07-21 ASSESSMENT — COGNITIVE AND FUNCTIONAL STATUS - GENERAL
DRESSING REGULAR LOWER BODY CLOTHING: A LITTLE
WALKING IN HOSPITAL ROOM: A LITTLE
CLIMB 3 TO 5 STEPS WITH RAILING: A LITTLE
PATIENT BASELINE BEDBOUND: NO
DAILY ACTIVITIY SCORE: 20
TURNING FROM BACK TO SIDE WHILE IN FLAT BAD: A LITTLE
DRESSING REGULAR UPPER BODY CLOTHING: A LITTLE
HELP NEEDED FOR BATHING: A LITTLE
MOVING TO AND FROM BED TO CHAIR: A LITTLE
MOBILITY SCORE: 18
STANDING UP FROM CHAIR USING ARMS: A LITTLE
TOILETING: A LITTLE
MOVING FROM LYING ON BACK TO SITTING ON SIDE OF FLAT BED WITH BEDRAILS: A LITTLE

## 2024-07-21 ASSESSMENT — LIFESTYLE VARIABLES
HOW OFTEN DO YOU HAVE A DRINK CONTAINING ALCOHOL: MONTHLY OR LESS
HOW MANY STANDARD DRINKS CONTAINING ALCOHOL DO YOU HAVE ON A TYPICAL DAY: 1 OR 2
AUDIT-C TOTAL SCORE: 1
AUDIT-C TOTAL SCORE: 1
HOW OFTEN DO YOU HAVE 6 OR MORE DRINKS ON ONE OCCASION: NEVER
SKIP TO QUESTIONS 9-10: 1

## 2024-07-21 ASSESSMENT — ACTIVITIES OF DAILY LIVING (ADL)
LACK_OF_TRANSPORTATION: NO
WALKS IN HOME: NEEDS ASSISTANCE
LACK_OF_TRANSPORTATION: NO
TOILETING: NEEDS ASSISTANCE
JUDGMENT_ADEQUATE_SAFELY_COMPLETE_DAILY_ACTIVITIES: YES
GROOMING: NEEDS ASSISTANCE
HEARING - RIGHT EAR: FUNCTIONAL
PATIENT'S MEMORY ADEQUATE TO SAFELY COMPLETE DAILY ACTIVITIES?: YES
HEARING - LEFT EAR: FUNCTIONAL
DRESSING YOURSELF: NEEDS ASSISTANCE
FEEDING YOURSELF: INDEPENDENT
BATHING: NEEDS ASSISTANCE
ADEQUATE_TO_COMPLETE_ADL: YES

## 2024-07-21 ASSESSMENT — ENCOUNTER SYMPTOMS: SHORTNESS OF BREATH: 0

## 2024-07-21 ASSESSMENT — PAIN SCALES - GENERAL: PAINLEVEL_OUTOF10: 7

## 2024-07-21 ASSESSMENT — PATIENT HEALTH QUESTIONNAIRE - PHQ9
2. FEELING DOWN, DEPRESSED OR HOPELESS: SEVERAL DAYS
SUM OF ALL RESPONSES TO PHQ9 QUESTIONS 1 & 2: 2
1. LITTLE INTEREST OR PLEASURE IN DOING THINGS: SEVERAL DAYS

## 2024-07-21 NOTE — ED PROVIDER NOTES
HPI     CC: No chief complaint on file.     HPI: Allyson Granados is a 84 y.o. female with a history of AF on Eliquis, CKD, CHF, metastatic breast CA s/p mastectomy/XRT with mets to the right globe, lung, lymph nodes, liver, brain, and bone, presents with nausea and vomiting.  She states that symptoms started this morning with countless episodes of initially nonbloody, nonbilious, then bilious, emesis.  She has associated chills.  She has been having hiccups for the past few days.  Last BM was 2 days ago.  She had her first radiation for brain mets 2 days ago.  She denies any headache, vision changes, abdominal pain, dysuria, hematuria, or other symptoms    ROS: 10-point review of systems was performed and is otherwise negative except as noted in HPI.    Limitations to history: N/A    Independent Historians: N/A    External Records Reviewed: Outpatient notes in EMR    Past Medical History: Noncontributory except per HPI     Past Surgical History: Noncontributory except per HPI     Family History: Reviewed and noncontributory     Social History:  Denies tobacco. Denies ETOH. Denies illicit drugs.    Social Determinants Affecting Care: N/A    Allergies   Allergen Reactions    Codeine Unknown and Nausea/vomiting    Diphenhydramine Other, Psychosis and Unknown     PSYCHOTIC    Diphenhydramine Hcl Unknown    Morphine Unknown, GI Upset and Nausea/vomiting       Home Meds:   Current Outpatient Medications   Medication Instructions    albuterol 90 mcg/actuation inhaler 2 puffs, inhalation, Every 4 hours PRN    amiodarone (PACERONE) 200 mg, oral, Every Day    apixaban (ELIQUIS) 5 mg, oral, 2 times daily    baclofen (LIORESAL) 2.5 mg, oral, 3 times daily    bisacodyl (DULCOLAX (BISACODYL)) 5-10 mg, oral, Daily PRN, Do not crush, chew, or split. Take if no BM in days    dexAMETHasone (DECADRON) 4 mg, oral, Daily, Take in the morning    diclofenac sodium 1 % kit Apply 2 inches to each knee and rub until gone. three times a day     dilTIAZem CD (Cardizem CD) 120 mg 24 hr capsule 1 capsule, oral, Daily (0630)    furosemide (LASIX) 20 mg, oral, Daily    Klayesta 100,000 unit/gram powder APPLY TOPICALLY IF NEEDED FOR RASH OR ITCHING.    levothyroxine (SYNTHROID, LEVOXYL) 50 mcg, oral, Daily    losartan (COZAAR) 50 mg, oral, 2 times daily    metoprolol succinate XL (TOPROL-XL) 50 mg, oral, Daily    moisturizing mouth (Biotene Oral Dry Mouth) solution 2 sprays, oral, As needed    naloxone (Narcan) 4 mg/0.1 mL nasal spray nasal, Spray one bottle into nostril while patient lay on their back, repeat if needed    nystatin (MYCOSTATIN) 500,000 Units, oral, 4 times daily    omeprazole (PRILOSEC) 20 mg, oral, Daily before breakfast, Do not crush or chew.    ondansetron (ZOFRAN) 4 mg, oral, Every 8 hours PRN, Second line    oxyCODONE-acetaminophen (Percocet) 5-325 mg tablet 1-1.5 tablets, oral, Every 4 hours PRN    potassium chloride CR 10 mEq ER tablet 10 mEq, oral, Daily, Do not crush, chew, or split.    pravastatin (PRAVACHOL) 40 mg, oral, Daily    prochlorperazine (COMPAZINE) 10 mg, oral, Every 6 hours PRN, First line    rOPINIRole (REQUIP) 4 mg, oral, 2 times daily        Physical Exam     ED Triage Vitals   Temp Pulse Resp BP   -- -- -- --      SpO2 Temp src Heart Rate Source Patient Position   -- -- -- --      BP Location FiO2 (%)     -- --               Physical Exam  Vitals and nursing note reviewed.     CONSTITUTIONAL: Chronically ill appearing, tired, well nourished, in no acute distress.   HENMT: Head atraumatic. Airway patent. Nasal mucosa clear. Mouth with notable thrush, clear oropharynx. Uvula midline. Neck supple.    EYES: Clear bilaterally, pupils equally round and reactive to light.   CARDIOVASCULAR: Normal rate, regular rhythm.  Heart sounds S1, S2.  No murmurs, rubs or gallops. Normal pulses. Capillary refill < 2 sec.   RESPIRATORY: No increased work of breathing. Breath sounds clear and equal bilaterally.  GASTROINTESTINAL: Abdomen  soft, diffusely tender mainly on the right. No rebound, no guarding. Normal bowel sounds. No palpable masses.  GENITOURINARY:  No CVA tenderness.  MUSCULOSKELETAL: Spine appears normal, range of motion is not limited, no muscle or joint tenderness. No edema.   NEUROLOGICAL: Alert and oriented, no asymmetry, moving all extremities equally.  SKIN: Warm, dry and intact. No rash or notable lesions.  PSYCHIATRIC: Normal mood and affect.  HEME/LYMPH: No adenopathy or splenomegaly.    Diagnostic Results      ECG: ECGs read and interpreted by me. See ED Course, below, for interpretation.    Labs Reviewed   CBC WITH AUTO DIFFERENTIAL   COMPREHENSIVE METABOLIC PANEL   MAGNESIUM   LIPASE   LACTATE   TROPONIN SERIES- (INITIAL, 1 HR)    Narrative:     The following orders were created for panel order Troponin I Series, High Sensitivity (0, 1 HR).  Procedure                               Abnormality         Status                     ---------                               -----------         ------                     Troponin I, High Sensiti...[[id number]]                                                   Please view results for these tests on the individual orders.   SERIAL TROPONIN-INITIAL         CT abdomen pelvis w IV contrast    (Results Pending)   CT head wo IV contrast    (Results Pending)                 No data recorded                Procedure  Procedures    ED Course & MDM   Assessment/Plan:   Allyson Granados is a 84 y.o. female with a history of AF on Eliquis, CKD, CHF, metastatic breast CA s/p mastectomy/XRT with mets to the right globe, lung, lymph nodes, liver, brain, and bone, presents with nausea and vomiting after getting brain radiation 2 days ago.  She denies overt abdominal pain but is notably tender in the right side of her abdomen.  She denies any new headache or neurologic complaints.  Differential includes radiation side effect, worsening intra-abdominal cancer, SBO, worsening intracranial disease.  She  does not have any infectious symptoms.  Workup was initiated with ECG, labs, CT of the head and abdomen. See below for details of ED course and ultimate disposition.    Medications   ondansetron (Zofran) injection 4 mg (has no administration in time range)   sodium chloride 0.9 % bolus 1,000 mL (has no administration in time range)        ED Course as of 07/21/24 0707   Sat Jul 20, 2024   2115 ECG read interpreted by me.  Sinus bradycardia, rate 52.  Normal axis.  Mildly prolonged QTc 491.  No ST or T wave derangements. [CG]   2227 Labs are notable for leukocytosis 16.2, normal H&H, normal platelets, CMP largely unremarkable, normal lipase and lactate, mildly elevated troponin 21 and stable on repeat at 24, normal magnesium, negative COVID swab. [CG]   2259 UA negative for UTI. [CG]   2259 CTH no acute abnormality. Multiple small round ring-enhancing metastatic lesions present on prior MR imaging are difficult to identify on this current noncontrast study. [CG]   2317 CTAP Metastatic disease redemonstrated. Suspect potential subtle progression of disease involving the liver compared to most recent PET imaging.    No definite new acute process in the abdomen or pelvis.   [CG]   2328 Patient was reevaluated.  She is still notably hypertensive.  Her nausea is improved.  She has notable thrush on exam.  She states she has not been able to tolerate any of her home medications due to significant nausea.  Family is concerned about dehydration.  She is requesting observation admission overnight for hydration and pain control.  Spoke with Dr. Salas who accepts the patient for admission. She is notably on nasal cannula currently, was reportedly low saturating 90% on room air. Will obtain CXR.  [CG]   Sun Jul 21, 2024   0105 CXR Numerous bilateral pulmonary nodules/masses consistent with metastatic disease. [CG]      ED Course User Index  [CG] Melissa Austin MD         Diagnoses as of 07/21/24 0707   Nausea and  vomiting, unspecified vomiting type       Disposition:   Admitted to OBS team, discussed differential and findings with patient as well as any family members at bedside.      ED Prescriptions    None         Melissa Austin MD  EM/IM/Peds    This note was dictated by speech recognition. Minor errors in transcription may be present.     Melissa Austin MD  07/21/24 0707

## 2024-07-21 NOTE — ED TRIAGE NOTES
Pt arrived to ED via EMS from home. Pt reports nausea and vomiting all day today, stating green bilious emesis. Pt has hx of breast cancer with mets to bone. Pt received radiation to bilateral hips yesterday, 7/19/24 and new radiation to brain Thursday 7/18/24. Pt is A&Ox4

## 2024-07-21 NOTE — H&P
History Of Present Illness  Allyson Granados is a 84 y.o. female with PMH of afib on eliquis, CKD, diastolic heart failure, metastatic breast cancer with mets to the lung, lymph nodes, liver, brain, and bone (just underwent first brain radiation treatment 2 days ago), HTN, asthma, COPD, HLD, hypothyroid, CAD, presenting with nausea, vomiting and hiccups.  This started after her radiation.  She has had trouble keeping things down, and has had decreased oral intake.       Past Medical History  Past Medical History:   Diagnosis Date    Breast cancer (Multi)     Personal history of irradiation     Personal history of other diseases of the circulatory system     History of hypertension    Personal history of other diseases of the musculoskeletal system and connective tissue     Personal history of arthritis    Personal history of other diseases of urinary system     History of kidney disease    Personal history of other endocrine, nutritional and metabolic disease     History of thyroid disease    Unspecified asthma, uncomplicated ([facility]) 12/21/2013    Asthma    Unspecified dementia, unspecified severity, without behavioral disturbance, psychotic disturbance, mood disturbance, and anxiety (Multi) 12/02/2020    Dementia, old age       Surgical History  Past Surgical History:   Procedure Laterality Date    BREAST LUMPECTOMY Left 01/19/2023    HYSTERECTOMY          Social History  She reports that she has never smoked. She has never used smokeless tobacco. She reports that she does not drink alcohol and does not use drugs.    Family History  Family History   Problem Relation Name Age of Onset    Breast cancer Sister  60        Allergies  Codeine, Diphenhydramine, Diphenhydramine hcl, and Morphine    Review of Systems     Physical Exam  Constitutional:       General: She is not in acute distress.     Appearance: She is not ill-appearing.   Neurological:      General: No focal deficit present.      Mental Status: She is alert.  "         Last Recorded Vitals  Blood pressure 171/71, pulse 58, temperature 36.1 °C (97 °F), temperature source Temporal, resp. rate 18, height 1.575 m (5' 2\"), weight 74.4 kg (164 lb), SpO2 96%.    Relevant Results      Scheduled medications  acetaminophen, 975 mg, oral, q8h  albuterol, 2.5 mg, nebulization, TID  amiodarone, 200 mg, oral, Every Day  apixaban, 5 mg, oral, BID  baclofen, 2.5 mg, oral, TID  dexAMETHasone, 4 mg, oral, Daily  levothyroxine, 50 mcg, oral, Daily before breakfast  losartan, 50 mg, oral, BID  metoprolol succinate XL, 50 mg, oral, Daily  nystatin, 500,000 Units, oral, 4x daily  ondansetron, 4 mg, intravenous, Once  oxyCODONE-acetaminophen, 1 tablet, oral, Once  pantoprazole, 40 mg, oral, Daily before breakfast   Or  pantoprazole, 40 mg, intravenous, Daily before breakfast  potassium chloride CR, 40 mEq, oral, Once  pravastatin, 40 mg, oral, Daily  rOPINIRole, 4 mg, oral, BID      Continuous medications     PRN medications  PRN medications: albuterol, HYDROmorphone, moisturizing mouth, ondansetron **OR** ondansetron     Results for orders placed or performed during the hospital encounter of 07/20/24 (from the past 24 hour(s))   Sars-CoV-2 PCR   Result Value Ref Range    Coronavirus 2019, PCR Not Detected Not Detected   CBC and Auto Differential   Result Value Ref Range    WBC 16.2 (H) 4.4 - 11.3 x10*3/uL    nRBC 0.0 0.0 - 0.0 /100 WBCs    RBC 4.53 4.00 - 5.20 x10*6/uL    Hemoglobin 12.3 12.0 - 16.0 g/dL    Hematocrit 40.0 36.0 - 46.0 %    MCV 88 80 - 100 fL    MCH 27.2 26.0 - 34.0 pg    MCHC 30.8 (L) 32.0 - 36.0 g/dL    RDW 16.7 (H) 11.5 - 14.5 %    Platelets 271 150 - 450 x10*3/uL    Neutrophils % 90.3 40.0 - 80.0 %    Immature Granulocytes %, Automated 1.4 (H) 0.0 - 0.9 %    Lymphocytes % 3.3 13.0 - 44.0 %    Monocytes % 4.9 2.0 - 10.0 %    Eosinophils % 0.0 0.0 - 6.0 %    Basophils % 0.1 0.0 - 2.0 %    Neutrophils Absolute 14.59 (H) 1.60 - 5.50 x10*3/uL    Immature Granulocytes Absolute, " Automated 0.22 0.00 - 0.50 x10*3/uL    Lymphocytes Absolute 0.54 (L) 0.80 - 3.00 x10*3/uL    Monocytes Absolute 0.80 0.05 - 0.80 x10*3/uL    Eosinophils Absolute 0.00 0.00 - 0.40 x10*3/uL    Basophils Absolute 0.02 0.00 - 0.10 x10*3/uL   Comprehensive metabolic panel   Result Value Ref Range    Glucose 113 (H) 74 - 99 mg/dL    Sodium 142 136 - 145 mmol/L    Potassium 3.5 3.5 - 5.3 mmol/L    Chloride 103 98 - 107 mmol/L    Bicarbonate 30 21 - 32 mmol/L    Anion Gap 13 10 - 20 mmol/L    Urea Nitrogen 27 (H) 6 - 23 mg/dL    Creatinine 0.92 0.50 - 1.05 mg/dL    eGFR 61 >60 mL/min/1.73m*2    Calcium 7.8 (L) 8.6 - 10.3 mg/dL    Albumin 3.0 (L) 3.4 - 5.0 g/dL    Alkaline Phosphatase 54 33 - 136 U/L    Total Protein 6.0 (L) 6.4 - 8.2 g/dL    AST 21 9 - 39 U/L    Bilirubin, Total 0.7 0.0 - 1.2 mg/dL    ALT 27 7 - 45 U/L   Magnesium   Result Value Ref Range    Magnesium 2.00 1.60 - 2.40 mg/dL   Lipase   Result Value Ref Range    Lipase 20 9 - 82 U/L   Lactate   Result Value Ref Range    Lactate 1.3 0.4 - 2.0 mmol/L   Troponin I, High Sensitivity, Initial   Result Value Ref Range    Troponin I, High Sensitivity 21 (H) 0 - 13 ng/L   Troponin, High Sensitivity, 1 Hour   Result Value Ref Range    Troponin I, High Sensitivity 24 (H) 0 - 13 ng/L   Urinalysis with Reflex Culture and Microscopic   Result Value Ref Range    Color, Urine Colorless (N) Light-Yellow, Yellow, Dark-Yellow    Appearance, Urine Clear Clear    Specific Gravity, Urine 1.018 1.005 - 1.035    pH, Urine 7.5 5.0, 5.5, 6.0, 6.5, 7.0, 7.5, 8.0    Protein, Urine NEGATIVE NEGATIVE, 10 (TRACE), 20 (TRACE) mg/dL    Glucose, Urine Normal Normal mg/dL    Blood, Urine 0.06 (1+) (A) NEGATIVE    Ketones, Urine NEGATIVE NEGATIVE mg/dL    Bilirubin, Urine NEGATIVE NEGATIVE    Urobilinogen, Urine Normal Normal mg/dL    Nitrite, Urine 1+ (A) NEGATIVE    Leukocyte Esterase, Urine NEGATIVE NEGATIVE   Microscopic Only, Urine   Result Value Ref Range    WBC, Urine 1-5 1-5, NONE /HPF     RBC, Urine 1-2 NONE, 1-2, 3-5 /HPF    Bacteria, Urine 1+ (A) NONE SEEN /HPF   CBC   Result Value Ref Range    WBC 15.8 (H) 4.4 - 11.3 x10*3/uL    nRBC 0.0 0.0 - 0.0 /100 WBCs    RBC 4.40 4.00 - 5.20 x10*6/uL    Hemoglobin 11.9 (L) 12.0 - 16.0 g/dL    Hematocrit 41.1 36.0 - 46.0 %    MCV 93 80 - 100 fL    MCH 27.0 26.0 - 34.0 pg    MCHC 29.0 (L) 32.0 - 36.0 g/dL    RDW 17.2 (H) 11.5 - 14.5 %    Platelets 250 150 - 450 x10*3/uL   Basic metabolic panel   Result Value Ref Range    Glucose 105 (H) 74 - 99 mg/dL    Sodium 143 136 - 145 mmol/L    Potassium 3.4 (L) 3.5 - 5.3 mmol/L    Chloride 107 98 - 107 mmol/L    Bicarbonate 27 21 - 32 mmol/L    Anion Gap 12 10 - 20 mmol/L    Urea Nitrogen 24 (H) 6 - 23 mg/dL    Creatinine 0.81 0.50 - 1.05 mg/dL    eGFR 71 >60 mL/min/1.73m*2    Calcium 7.4 (L) 8.6 - 10.3 mg/dL     XR chest 2 views    Result Date: 7/21/2024  Interpreted By:  Alberta Thomas, STUDY: XR CHEST 2 VIEWS;  7/20/2024 11:55 pm   INDICATION: Signs/Symptoms:Hypoxia.   COMPARISON: Chest radiograph 10/29/2021, correlation with CT abdomen pelvis 07/20/2024   ACCESSION NUMBER(S): [id number]   ORDERING CLINICIAN: KEMAR DREW   FINDINGS: Left axillary surgical clips noted.   CARDIOMEDIASTINAL SILHOUETTE: New prominence of the left hilum on the AP view is favored to represent overlap hilum and left lower lobe nodule/mass. The cardiac silhouette is not enlarged.   LUNGS: Numerous bilateral pulmonary nodules/masses, new from 10/29/2021. Small left pleural effusion. No pneumothorax.   ABDOMEN: No remarkable upper abdominal findings.   BONES: No acute osseous abnormality.       Numerous bilateral pulmonary nodules/masses consistent with metastatic disease.   MACRO: None   Signed by: Alberta Thomas 7/21/2024 12:55 AM Dictation workstation:   [id number]    CT head wo IV contrast    Addendum Date: 7/20/2024    Interpreted By:  Cy Garcia, ADDENDUM: Additional comparison imaging made to prior MR brain 06/27/2024.  Multiple small round ring-enhancing metastatic lesions present on prior MR imaging are difficult to identify on this current noncontrast study.   Signed by: Cy Garcia 7/20/2024 11:11 PM   -------- ORIGINAL REPORT -------- Dictation workstation:   [id number]    Result Date: 7/20/2024  Interpreted By:  Cy Garcia, STUDY: CT HEAD WO IV CONTRAST;  7/20/2024 10:26 pm   INDICATION: Signs/Symptoms:Vomiting, recent radiation for brain mets.   COMPARISON: None.   ACCESSION NUMBER(S): [id number]   ORDERING CLINICIAN: KEMAR DREW   TECHNIQUE: Noncontrast axial CT images of head were obtained with coronal and sagittal reconstructed images.   FINDINGS: BRAIN PARENCHYMA: There is patchy hypoattenuation of the white matter consistent with chronic small-vessel ischemia. Probable old focal lacunar infarct right thalamus. Mild diffuse cortical atrophy. No acute intraparenchymal hemorrhage or parenchymal evidence of acute large territory ischemic infarct. No mass-effect. Gray-white matter distinction is preserved.   VENTRICLES and EXTRA-AXIAL SPACES:  No acute extra-axial or intraventricular hemorrhage. No effacement of cerebral sulci. Ventricles and sulci are age-concordant.   PARANASAL SINUSES/MASTOIDS:  No hemorrhage or air-fluid levels within the visualized paranasal sinuses. The mastoids are well aerated.   CALVARIUM/ORBITS:  No skull fracture. The orbits and globes are intact to the extent visualized.   EXTRACRANIAL SOFT TISSUES: No discernible abnormality.       No acute intracranial abnormality.     MACRO: None.   Signed by: Cy Garcia 7/20/2024 10:57 PM Dictation workstation:   [id number]    CT abdomen pelvis w IV contrast    Result Date: 7/20/2024  Interpreted By:  Cy Garcia, STUDY: CT ABDOMEN PELVIS W IV CONTRAST;  7/20/2024 10:26 pm   INDICATION: Signs/Symptoms:abdominal pain, hx metastatic CA, vomiting.   COMPARISON: None.   ACCESSION NUMBER(S): [id number]   ORDERING CLINICIAN: KEMAR DREW    TECHNIQUE: Contiguous axial images of the abdomen and pelvis were obtained after the intravenous administration of iodinated contrast. Coronal and sagittal reformatted images were reconstructed from the axial data.   FINDINGS: Numerous pulmonary metastatic lesions are redemonstrated lung bases measuring up to 6 cm left lower lobe.   Scattered metastatic lesions are seen throughout the liver which may have increased in number compared to recent PET-CT.   Adrenals pancreas spleen kidneys are unremarkable.   Gallbladder is absent   No new bowel obstruction.   The bladder is unremarkable.   Moderate calcification aorta and iliac arteries.   Some vague areas of lucency seen involving osseous structures consistent with known metastatic disease. No new fracture.       Metastatic disease redemonstrated. Suspect potential subtle progression of disease involving the liver compared to most recent PET imaging.   No definite new acute process in the abdomen or pelvis.         MACRO: None.   Signed by: Cy Garcia 7/20/2024 11:10 PM Dictation workstation:   [id number]     Assessment/Plan   Allyson Granados is a 84 y.o. female with PMH of afib on eliquis, CKD, diastolic heart failure, metastatic breast cancer with mets to the lung, lymph nodes, liver, brain, and bone (just underwent first brain radiation treatment 2 days ago), HTN, asthma, COPD, HLD, hypothyroid, CAD, presenting with nausea, vomiting and hiccups.  This started after her radiation.  She has had trouble keeping things down, and has had decreased oral intake.      Nausea and vomiting   - likely related to recent radiation and extensive metastatic disease   - seen by GI   - After further discussion patient and family are interested in hospice care, which is very appropriate given extensive metastatic disease and advanced age.    - hospice consult placed     Other comorbidities as above   -home med list reviewed, continue as ordered and adjust based on clinical  course    Discharge Planning   - hospice consulted.     Discussed with hospitalist.       I spent 30 minutes in the professional and overall care of this patient.      Fiona Braga, APRN-CNP

## 2024-07-21 NOTE — CONSULTS
Gastroenterology Consultation Note    ASSESSMENT and PLAN:     Allyson Granados is a 84 y.o. female with a past medical history of metastatic breast cancer undergoing XRT and WBR who presented with N/V. Her N/V started after her radiation and I think this is side effects related to radiation and progression of her metastatic disease. She and her son at bedside report that they do not intend on any further radiation and are asking about referral to hospice. Recommend symptomatic management and I have relayed this hospice request to the primary service. No plans for endoscopic evaluation. Symptomatic care as per primary team.    GI will sign-off.     Aneesh Gill MD    HISTORY OF PRESENT ILLNESS:     History Of Present Illness:    Allyson Granados is a 84 y.o. female with breast cancer with brain and liver mets who presented with N/V after recent initiation of WBR and XRT of hip. She reports since radiation she has not been able to keep anything down and poor PO intake. They came to hospital for this reason. Denies abdominal pain. She is requesting referral to hospice which is confirmed by son at bedside. Patient does not intend on further radiation therapy or aggressive care and wishes to focus on symptom management.     Relevant endoscopic evaluation results were personally reviewed.    Review of systems:   Review of Systems   Respiratory:  Negative for shortness of breath.    Cardiovascular:  Negative for chest pain.   All other systems reviewed and are negative.     A 10+ point ROS was completed and otherwise negative except as noted and per HPI.    PAST HISTORIES:     Past Medical History:  Past Medical History:   Diagnosis Date    Breast cancer (Multi)     Personal history of irradiation     Personal history of other diseases of the circulatory system     History of hypertension    Personal history of other diseases of the musculoskeletal system and connective tissue     Personal history of arthritis    Personal  history of other diseases of urinary system     History of kidney disease    Personal history of other endocrine, nutritional and metabolic disease     History of thyroid disease    Unspecified asthma, uncomplicated ([facility]) 12/21/2013    Asthma    Unspecified dementia, unspecified severity, without behavioral disturbance, psychotic disturbance, mood disturbance, and anxiety (Multi) 12/02/2020    Dementia, old age       Past Surgical History:  Past Surgical History:   Procedure Laterality Date    BREAST LUMPECTOMY Left 01/19/2023    HYSTERECTOMY          Family History:  Family History   Problem Relation Name Age of Onset    Breast cancer Sister  60        Social History:   reports that she has never smoked. She has never used smokeless tobacco. She reports that she does not drink alcohol and does not use drugs.    OBJECTIVE:     Last Recorded Vitals:  Vitals:    07/20/24 2331 07/21/24 0200 07/21/24 0351 07/21/24 0807   BP: 135/58 125/63 143/71 162/70   BP Location:   Right arm    Patient Position:   Lying    Pulse: 55 53 60 53   Resp: (!) 21 16 16 18   Temp:   36.5 °C (97.7 °F) 36.1 °C (97 °F)   TempSrc:   Temporal Temporal   SpO2: 97% 97% 96% 98%   Weight:       Height:           Physical Exam:  CONSTITUTIONAL: appears chronically ill  ABDOMEN: soft, NTND  NEUROLOGIC: AOx3    Allergies:  Allergies   Allergen Reactions    Codeine Unknown and Nausea/vomiting    Diphenhydramine Other, Psychosis and Unknown     PSYCHOTIC    Diphenhydramine Hcl Unknown    Morphine Unknown, GI Upset and Nausea/vomiting       Inpatient Medications:  acetaminophen, 975 mg, oral, q8h  albuterol, 2.5 mg, nebulization, TID  amiodarone, 200 mg, oral, Every Day  apixaban, 5 mg, oral, BID  baclofen, 2.5 mg, oral, TID  dexAMETHasone, 4 mg, oral, Daily  levothyroxine, 50 mcg, oral, Daily before breakfast  losartan, 50 mg, oral, BID  metoprolol succinate XL, 50 mg, oral, Daily  nystatin, 500,000 Units, oral, 4x daily  ondansetron, 4 mg,  intravenous, Once  oxyCODONE-acetaminophen, 1 tablet, oral, Once  pantoprazole, 40 mg, oral, Daily before breakfast   Or  pantoprazole, 40 mg, intravenous, Daily before breakfast  potassium chloride CR, 40 mEq, oral, Once  pravastatin, 40 mg, oral, Daily  rOPINIRole, 4 mg, oral, BID         PRN medications: albuterol, HYDROmorphone, moisturizing mouth, ondansetron **OR** ondansetron    Outpatient Medications:  Prior to Admission medications    Medication Sig Start Date End Date Taking? Authorizing Provider   albuterol 90 mcg/actuation inhaler Inhale 2 puffs every 4 hours if needed. 2/11/20   Historical Provider, MD   amiodarone (Pacerone) 200 mg tablet Take 1 tablet (200 mg) by mouth once every day. 1/12/24   KRISTI Issa-CNP   apixaban (Eliquis) 5 mg tablet Take 1 tablet (5 mg) by mouth 2 times a day. 12/20/23   KELLI Maciel   baclofen (Lioresal) 5 mg tablet Take 0.5 tablets (2.5 mg) by mouth 3 times a day for 14 days. 7/19/24 8/2/24  KELLI Painter   bisacodyl (Dulcolax, bisacodyl,) 5 mg EC tablet Take 1-2 tablets (5-10 mg) by mouth once daily as needed for constipation. Do not crush, chew, or split. Take if no BM in days 7/10/24 10/8/24  KELLI Painter   dexAMETHasone (Decadron) 4 mg tablet Take 1 tablet (4 mg) by mouth once daily for 15 days. Take in the morning 7/10/24 7/25/24  KELLI Painter   diclofenac sodium 1 % kit Apply 2 inches to each knee and rub until gone. three times a day 11/18/20   Historical Provider, MD   dilTIAZem CD (Cardizem CD) 120 mg 24 hr capsule Take 1 capsule (120 mg) by mouth early in the morning.. 6/30/24   Historical Provider, MD   furosemide (Lasix) 20 mg tablet Take 1 tablet (20 mg) by mouth once daily. 5/8/24   KRISTI Maciel-CNP   Klayesta 100,000 unit/gram powder APPLY TOPICALLY IF NEEDED FOR RASH OR ITCHING. 7/8/24   KRISTI Maciel-CNP   levothyroxine (Synthroid, Levoxyl) 50 mcg tablet Take 1 tablet (50  mcg) by mouth once daily. 2/19/24   KELLI Maciel   losartan (Cozaar) 50 mg tablet Take 1 tablet (50 mg) by mouth 2 times a day. 12/20/23   KELLI Maciel   metoprolol succinate XL (Toprol-XL) 50 mg 24 hr tablet Take 1 tablet (50 mg) by mouth once daily. 1/12/24   Leyda EgKELLI patino   moisturizing mouth (Biotene Oral Dry Mouth) solution Take 2 sprays by mouth if needed (dry mouth). 7/10/24   KELLI Painter   naloxone (Narcan) 4 mg/0.1 mL nasal spray Administer into affected nostril(s). Spray one bottle into nostril while patient lay on their back, repeat if needed 3/23/22   Historical Provider, MD   nystatin (Mycostatin) 100,000 unit/mL suspension Take 5 mL (500,000 Units) by mouth 4 times a day for 10 days. 7/19/24 7/29/24  KELLI Painter   omeprazole (PriLOSEC) 20 mg DR capsule Take 1 capsule (20 mg) by mouth once daily in the morning. Take before meals. Do not crush or chew. 7/10/24 7/10/25  KELLI Painter   ondansetron (Zofran) 4 mg tablet Take 1 tablet (4 mg) by mouth every 8 hours if needed for nausea or vomiting. Second line 7/10/24 9/8/24  KELLI Painter   oxyCODONE-acetaminophen (Percocet) 5-325 mg tablet Take 1-1.5 tablets by mouth every 4 hours if needed for severe pain (7 - 10) for up to 10 days. 7/10/24 7/20/24  KELLI Painter   potassium chloride CR 10 mEq ER tablet Take 1 tablet (10 mEq) by mouth once daily. Do not crush, chew, or split. 5/8/24   KELLI Maciel   pravastatin (Pravachol) 40 mg tablet TAKE 1 TABLET BY MOUTH EVERY DAY 4/12/24   Fredis Aranda, DO   prochlorperazine (Compazine) 10 mg tablet Take 1 tablet (10 mg) by mouth every 6 hours if needed for nausea. First line 7/10/24 9/8/24  Marcy Meyer, APRN-CNP   rOPINIRole (Requip) 4 mg tablet Take 1 tablet (4 mg) by mouth 2 times a day. 2/27/24   Gini Orantes, APRN-CNP   nystatin (Mycostatin) 100,000 unit/mL suspension  Take 5 mL (500,000 Units) by mouth 4 times a day.  7/19/24  Marcy Meyer, APRN-CNP       LABS AND IMAGING:     Labs:  Results for orders placed or performed during the hospital encounter of 07/20/24 (from the past 24 hour(s))   Sars-CoV-2 PCR   Result Value Ref Range    Coronavirus 2019, PCR Not Detected Not Detected   CBC and Auto Differential   Result Value Ref Range    WBC 16.2 (H) 4.4 - 11.3 x10*3/uL    nRBC 0.0 0.0 - 0.0 /100 WBCs    RBC 4.53 4.00 - 5.20 x10*6/uL    Hemoglobin 12.3 12.0 - 16.0 g/dL    Hematocrit 40.0 36.0 - 46.0 %    MCV 88 80 - 100 fL    MCH 27.2 26.0 - 34.0 pg    MCHC 30.8 (L) 32.0 - 36.0 g/dL    RDW 16.7 (H) 11.5 - 14.5 %    Platelets 271 150 - 450 x10*3/uL    Neutrophils % 90.3 40.0 - 80.0 %    Immature Granulocytes %, Automated 1.4 (H) 0.0 - 0.9 %    Lymphocytes % 3.3 13.0 - 44.0 %    Monocytes % 4.9 2.0 - 10.0 %    Eosinophils % 0.0 0.0 - 6.0 %    Basophils % 0.1 0.0 - 2.0 %    Neutrophils Absolute 14.59 (H) 1.60 - 5.50 x10*3/uL    Immature Granulocytes Absolute, Automated 0.22 0.00 - 0.50 x10*3/uL    Lymphocytes Absolute 0.54 (L) 0.80 - 3.00 x10*3/uL    Monocytes Absolute 0.80 0.05 - 0.80 x10*3/uL    Eosinophils Absolute 0.00 0.00 - 0.40 x10*3/uL    Basophils Absolute 0.02 0.00 - 0.10 x10*3/uL   Comprehensive metabolic panel   Result Value Ref Range    Glucose 113 (H) 74 - 99 mg/dL    Sodium 142 136 - 145 mmol/L    Potassium 3.5 3.5 - 5.3 mmol/L    Chloride 103 98 - 107 mmol/L    Bicarbonate 30 21 - 32 mmol/L    Anion Gap 13 10 - 20 mmol/L    Urea Nitrogen 27 (H) 6 - 23 mg/dL    Creatinine 0.92 0.50 - 1.05 mg/dL    eGFR 61 >60 mL/min/1.73m*2    Calcium 7.8 (L) 8.6 - 10.3 mg/dL    Albumin 3.0 (L) 3.4 - 5.0 g/dL    Alkaline Phosphatase 54 33 - 136 U/L    Total Protein 6.0 (L) 6.4 - 8.2 g/dL    AST 21 9 - 39 U/L    Bilirubin, Total 0.7 0.0 - 1.2 mg/dL    ALT 27 7 - 45 U/L   Magnesium   Result Value Ref Range    Magnesium 2.00 1.60 - 2.40 mg/dL   Lipase   Result Value Ref Range     Lipase 20 9 - 82 U/L   Lactate   Result Value Ref Range    Lactate 1.3 0.4 - 2.0 mmol/L   Troponin I, High Sensitivity, Initial   Result Value Ref Range    Troponin I, High Sensitivity 21 (H) 0 - 13 ng/L   Troponin, High Sensitivity, 1 Hour   Result Value Ref Range    Troponin I, High Sensitivity 24 (H) 0 - 13 ng/L   Urinalysis with Reflex Culture and Microscopic   Result Value Ref Range    Color, Urine Colorless (N) Light-Yellow, Yellow, Dark-Yellow    Appearance, Urine Clear Clear    Specific Gravity, Urine 1.018 1.005 - 1.035    pH, Urine 7.5 5.0, 5.5, 6.0, 6.5, 7.0, 7.5, 8.0    Protein, Urine NEGATIVE NEGATIVE, 10 (TRACE), 20 (TRACE) mg/dL    Glucose, Urine Normal Normal mg/dL    Blood, Urine 0.06 (1+) (A) NEGATIVE    Ketones, Urine NEGATIVE NEGATIVE mg/dL    Bilirubin, Urine NEGATIVE NEGATIVE    Urobilinogen, Urine Normal Normal mg/dL    Nitrite, Urine 1+ (A) NEGATIVE    Leukocyte Esterase, Urine NEGATIVE NEGATIVE   Microscopic Only, Urine   Result Value Ref Range    WBC, Urine 1-5 1-5, NONE /HPF    RBC, Urine 1-2 NONE, 1-2, 3-5 /HPF    Bacteria, Urine 1+ (A) NONE SEEN /HPF   CBC   Result Value Ref Range    WBC 15.8 (H) 4.4 - 11.3 x10*3/uL    nRBC 0.0 0.0 - 0.0 /100 WBCs    RBC 4.40 4.00 - 5.20 x10*6/uL    Hemoglobin 11.9 (L) 12.0 - 16.0 g/dL    Hematocrit 41.1 36.0 - 46.0 %    MCV 93 80 - 100 fL    MCH 27.0 26.0 - 34.0 pg    MCHC 29.0 (L) 32.0 - 36.0 g/dL    RDW 17.2 (H) 11.5 - 14.5 %    Platelets 250 150 - 450 x10*3/uL   Basic metabolic panel   Result Value Ref Range    Glucose 105 (H) 74 - 99 mg/dL    Sodium 143 136 - 145 mmol/L    Potassium 3.4 (L) 3.5 - 5.3 mmol/L    Chloride 107 98 - 107 mmol/L    Bicarbonate 27 21 - 32 mmol/L    Anion Gap 12 10 - 20 mmol/L    Urea Nitrogen 24 (H) 6 - 23 mg/dL    Creatinine 0.81 0.50 - 1.05 mg/dL    eGFR 71 >60 mL/min/1.73m*2    Calcium 7.4 (L) 8.6 - 10.3 mg/dL        Relevant imaging results were personally reviewed.   IMPRESSION:  Metastatic disease redemonstrated.  Suspect potential subtle  progression of disease involving the liver compared to most recent  PET imaging.      No definite new acute process in the abdomen or pelvis.

## 2024-07-21 NOTE — DISCHARGE SUMMARY
Discharge Diagnosis  Abdominal pain    Issues Requiring Follow-Up      Discharge Meds     Your medication list        CONTINUE taking these medications        Instructions Last Dose Given Next Dose Due   albuterol 90 mcg/actuation inhaler           amiodarone 200 mg tablet  Commonly known as: Pacerone      Take 1 tablet (200 mg) by mouth once every day.       apixaban 5 mg tablet  Commonly known as: Eliquis      Take 1 tablet (5 mg) by mouth 2 times a day.       baclofen 5 mg tablet  Commonly known as: Lioresal      Take 0.5 tablets (2.5 mg) by mouth 3 times a day for 14 days.       bisacodyl 5 mg EC tablet  Commonly known as: Dulcolax (bisacodyl)      Take 1-2 tablets (5-10 mg) by mouth once daily as needed for constipation. Do not crush, chew, or split. Take if no BM in days       dexAMETHasone 4 mg tablet  Commonly known as: Decadron      Take 1 tablet (4 mg) by mouth once daily for 15 days. Take in the morning       diclofenac sodium 1 % kit           dilTIAZem  mg 24 hr capsule  Commonly known as: Cardizem CD           furosemide 20 mg tablet  Commonly known as: Lasix      Take 1 tablet (20 mg) by mouth once daily.       Klayesta 100,000 unit/gram powder  Generic drug: nystatin      APPLY TOPICALLY IF NEEDED FOR RASH OR ITCHING.       nystatin 100,000 unit/mL suspension  Commonly known as: Mycostatin      Take 5 mL (500,000 Units) by mouth 4 times a day for 10 days.       levothyroxine 50 mcg tablet  Commonly known as: Synthroid, Levoxyl      Take 1 tablet (50 mcg) by mouth once daily.       losartan 50 mg tablet  Commonly known as: Cozaar      Take 1 tablet (50 mg) by mouth 2 times a day.       metoprolol succinate XL 50 mg 24 hr tablet  Commonly known as: Toprol-XL      Take 1 tablet (50 mg) by mouth once daily.       moisturizing mouth solution  Commonly known as: Biotene Oral Dry Mouth      Take 2 sprays by mouth if needed (dry mouth).       naloxone 4 mg/0.1 mL nasal spray  Commonly known as:  Narcan           omeprazole 20 mg DR capsule  Commonly known as: PriLOSEC      Take 1 capsule (20 mg) by mouth once daily in the morning. Take before meals. Do not crush or chew.       ondansetron 4 mg tablet  Commonly known as: Zofran      Take 1 tablet (4 mg) by mouth every 8 hours if needed for nausea or vomiting. Second line       potassium chloride CR 10 mEq ER tablet  Commonly known as: Klor-Con      Take 1 tablet (10 mEq) by mouth once daily. Do not crush, chew, or split.       pravastatin 40 mg tablet  Commonly known as: Pravachol      TAKE 1 TABLET BY MOUTH EVERY DAY       prochlorperazine 10 mg tablet  Commonly known as: Compazine      Take 1 tablet (10 mg) by mouth every 6 hours if needed for nausea. First line       rOPINIRole 4 mg tablet  Commonly known as: Requip      Take 1 tablet (4 mg) by mouth 2 times a day.                Test Results Pending At Discharge  Pending Labs       Order Current Status    Extra Urine Gray Tube Collected (07/20/24 2231)    Urinalysis with Reflex Culture and Microscopic In process    Urine Culture In process            Hospital Course     Allyson Granados is a 84 y.o. female with PMH of afib on eliquis, CKD, diastolic heart failure, metastatic breast cancer with mets to the lung, lymph nodes, liver, brain, and bone (just underwent first brain radiation treatment 2 days ago), HTN, asthma, COPD, HLD, hypothyroid, CAD, presenting with nausea, vomiting and hiccups.  This started after her radiation.  She has had trouble keeping things down, and has had decreased oral intake.       Nausea and vomiting   - likely related to recent radiation and extensive metastatic disease   - seen by GI   - After further discussion patient and family are interested in hospice care, which is very appropriate given extensive metastatic disease and advanced age.    - hospice consult placed     Disposition:   Patient and family have signed with [facility] and will be discharged to  Hospice House.       Pertinent Physical Exam At Time of Discharge  Physical Exam    Physical Exam  Constitutional:       General: She is not in acute distress.     Appearance: She is not ill-appearing.   Neurological:      General: No focal deficit present.      Mental Status: She is alert.     Outpatient Follow-Up  Future Appointments   Date Time Provider Department Center   7/22/2024 11:30 AM [facility] [id number] [facility]   7/23/2024  8:30 AM [facility] [id number] [facility]   7/23/2024  2:30 PM Marcy Meyer, APRN-CNP [id number] [facility]   7/24/2024  2:30 PM [facility] [id number] [facility]   7/25/2024 11:15 AM [facility] [id number] [facility]   7/26/2024  3:00 PM [facility] [id number] [facility]   7/29/2024  9:15 AM [facility] [id number] [facility]   7/30/2024  9:15 AM [facility] [id number] [facility]   7/31/2024  9:15 AM [facility] [id number] [facility]   8/8/2024  8:00 AM Gini Orantes, APRN-CNP [id number] [facility]   9/11/2024  8:30 AM Suyapa Hdez APRN-CNP [id number] [facility]   11/18/2024  9:00 AM Chung FOWLER MD [id number] [facility]   1/24/2025  9:15 AM Jolene Anthony, APRN-CNP [id number] [facility]         Fiona Braga, APRN-CNP

## 2024-07-21 NOTE — PROGRESS NOTES
"   07/21/24 0944   Discharge Planning   Living Arrangements Children   Support Systems Children   Assistance Needed met with andreina. she is tearful, has metastatic CA. she states she is ready to die. I briefly discussed hospice, she deferred me to her daughter. she lives with son, was ambulatory, bad uncontrolled pain   Type of Residence Private residence   Do you have animals or pets at home? No   Home or Post Acute Services Other (Comment)  (hospice?)   Financial Resource Strain   How hard is it for you to pay for the very basics like food, housing, medical care, and heating? Not hard   Housing Stability   In the last 12 months, was there a time when you were not able to pay the mortgage or rent on time? N   In the past 12 months, how many times have you moved where you were living? 1   At any time in the past 12 months, were you homeless or living in a shelter (including now)? N   Transportation Needs   In the past 12 months, has lack of transportation kept you from medical appointments or from getting medications? no   In the past 12 months, has lack of transportation kept you from meetings, work, or from getting things needed for daily living? No     Allyson Granados is a 84 y.o. female on day 0 of admission presenting with Abdominal pain.    Antonia Saez, RN  1030   I spoke to listed daughter with patients permission. I advised her that she had requested hospice; daughter states that if patient requested it, she does not feel that this is  just a whim; has been going thru chemo/ radiation, and is \"ready\"  I sent referral at daughters request to amanda Ly  12 informed that patient will dc to hospice house today  Barbara       "

## 2024-07-22 ENCOUNTER — TELEPHONE (OUTPATIENT)
Dept: PALLIATIVE MEDICINE | Facility: HOSPITAL | Age: 84
End: 2024-07-22
Payer: MEDICARE

## 2024-07-22 ENCOUNTER — APPOINTMENT (OUTPATIENT)
Dept: RADIATION ONCOLOGY | Facility: CLINIC | Age: 84
End: 2024-07-22
Payer: MEDICARE

## 2024-07-22 NOTE — TELEPHONE ENCOUNTER
Follow up call after starting Baclofen. Spoke to daughter Magaly and she stated patient had a rough weekend with regards to nausea/vomiting. Patient elected Hospice and went to the inpatient unit. Marcy Meyer updated.

## 2024-07-23 ENCOUNTER — APPOINTMENT (OUTPATIENT)
Dept: RADIATION ONCOLOGY | Facility: CLINIC | Age: 84
End: 2024-07-23
Payer: MEDICARE

## 2024-07-23 ENCOUNTER — APPOINTMENT (OUTPATIENT)
Dept: PALLIATIVE MEDICINE | Facility: HOSPITAL | Age: 84
End: 2024-07-23
Payer: MEDICARE

## 2024-07-23 LAB — BACTERIA UR CULT: ABNORMAL

## 2024-07-23 NOTE — PROGRESS NOTES
Radiation Oncology Treatment Summary    Patient Name:  Allyson Granados  MRN:  [id number]  :  1940    Referring Provider: Boy Bates MD  Primary Care Provider: KELLI Maciel    Brief History: Allyson Granados is a 84 y.o. female with Malignant neoplasm of breast (Multi), Pathologic: Stage IIIA (pT2, pN1a, cM0, G3, ER-, AL-, HER2-).  The patient completed radiotherapy as outlined below.    Radiation Treatment Summary:    Left breast and comprehensive troy areas    Treatment Period Technique Fraction Dose Fractions Total Dose   Course 1 2023-10/16/2023  (days elapsed: 34)         Left breast CNI 2023-10/13/2023 VMAT 245 / 245 cGy 25  6,125 / 6,125 cGy      Brain    Treatment Period Technique Fraction Dose Fractions Total Dose   Course 2 2024-2024  (days elapsed: 0)         brain/r orbit 2024-2024 3D 300 / 300 cGy 1 / 10 300 / 3,000 cGy     Left hip    Treatment Period Technique Fraction Dose Fractions Total Dose   Course 2 2024-2024  (days elapsed: 0)         Left Hip 2024-2024 3D 800 / 800 cGy  800 /800 cGy     Please see the patient's Mosaiq chart for further details regarding the radiation plan, including beam energy.    Concurrent Chemotherapy:  Treatment Plans       No treatment plans exist          CTCAE Toxicity Overview:   Toxicity Assessment          2024    16:08   Toxicity Assessment   Adverse Events Reviewed (WDL) No (Exceptions to WDL)   Treatment Site Breast   Anorexia Grade 0       thrush noted - nystatin called in per MD   Anxiety Grade 0   Dehydration Grade 0   Depression Grade 0   Dermatitis Radiation Grade 0   Diarrhea Grade 0   Fatigue Grade 1   Nausea Grade 1       caused from hiccups   Pain Grade 1       all over back   Dry Mouth Grade 1       biotene and nystatin   Breast Infection Grade 0   Edema Limbs Grade 1     Patient Disposition: Ms. Granados has chosen to stop her treatment before completion and enroll in  hospice.  MsLaya Aureliosatinderky is welcome to contact the clinic with any concerns.

## 2024-07-24 ENCOUNTER — APPOINTMENT (OUTPATIENT)
Dept: RADIATION ONCOLOGY | Facility: CLINIC | Age: 84
End: 2024-07-24
Payer: MEDICARE

## 2024-07-25 ENCOUNTER — APPOINTMENT (OUTPATIENT)
Dept: RADIATION ONCOLOGY | Facility: CLINIC | Age: 84
End: 2024-07-25
Payer: MEDICARE

## 2024-07-26 ENCOUNTER — APPOINTMENT (OUTPATIENT)
Dept: RADIATION ONCOLOGY | Facility: CLINIC | Age: 84
End: 2024-07-26
Payer: MEDICARE

## 2024-07-29 ENCOUNTER — APPOINTMENT (OUTPATIENT)
Dept: RADIATION ONCOLOGY | Facility: CLINIC | Age: 84
End: 2024-07-29
Payer: MEDICARE

## 2024-07-30 ENCOUNTER — APPOINTMENT (OUTPATIENT)
Dept: RADIATION ONCOLOGY | Facility: CLINIC | Age: 84
End: 2024-07-30
Payer: MEDICARE

## 2024-07-31 ENCOUNTER — APPOINTMENT (OUTPATIENT)
Dept: RADIATION ONCOLOGY | Facility: CLINIC | Age: 84
End: 2024-07-31
Payer: MEDICARE

## 2024-08-08 ENCOUNTER — APPOINTMENT (OUTPATIENT)
Dept: PRIMARY CARE | Facility: CLINIC | Age: 84
End: 2024-08-08
Payer: MEDICARE

## 2024-09-11 ENCOUNTER — APPOINTMENT (OUTPATIENT)
Dept: HEMATOLOGY/ONCOLOGY | Facility: CLINIC | Age: 84
End: 2024-09-11
Payer: MEDICARE

## 2024-11-18 ENCOUNTER — APPOINTMENT (OUTPATIENT)
Dept: CARDIOLOGY | Facility: CLINIC | Age: 84
End: 2024-11-18
Payer: MEDICARE

## 2025-01-24 ENCOUNTER — APPOINTMENT (OUTPATIENT)
Dept: RADIATION ONCOLOGY | Facility: CLINIC | Age: 85
End: 2025-01-24
Payer: MEDICARE